# Patient Record
Sex: FEMALE | Race: OTHER | HISPANIC OR LATINO | Employment: UNEMPLOYED | URBAN - METROPOLITAN AREA
[De-identification: names, ages, dates, MRNs, and addresses within clinical notes are randomized per-mention and may not be internally consistent; named-entity substitution may affect disease eponyms.]

---

## 2022-12-22 ENCOUNTER — OFFICE VISIT (OUTPATIENT)
Dept: FAMILY MEDICINE CLINIC | Facility: CLINIC | Age: 42
End: 2022-12-22

## 2022-12-22 VITALS
RESPIRATION RATE: 16 BRPM | DIASTOLIC BLOOD PRESSURE: 60 MMHG | BODY MASS INDEX: 39.49 KG/M2 | HEART RATE: 61 BPM | WEIGHT: 237 LBS | OXYGEN SATURATION: 98 % | SYSTOLIC BLOOD PRESSURE: 120 MMHG | HEIGHT: 65 IN

## 2022-12-22 DIAGNOSIS — E55.9 VITAMIN D DEFICIENCY: ICD-10-CM

## 2022-12-22 DIAGNOSIS — Z11.4 SCREENING FOR HIV (HUMAN IMMUNODEFICIENCY VIRUS): ICD-10-CM

## 2022-12-22 DIAGNOSIS — M79.605 PAIN IN BOTH LOWER EXTREMITIES: ICD-10-CM

## 2022-12-22 DIAGNOSIS — Z12.31 ENCOUNTER FOR SCREENING MAMMOGRAM FOR BREAST CANCER: ICD-10-CM

## 2022-12-22 DIAGNOSIS — Z13.6 SCREENING FOR CARDIOVASCULAR CONDITION: ICD-10-CM

## 2022-12-22 DIAGNOSIS — M62.830 BACK SPASM: ICD-10-CM

## 2022-12-22 DIAGNOSIS — Z12.4 CERVICAL CANCER SCREENING: ICD-10-CM

## 2022-12-22 DIAGNOSIS — L30.9 DERMATITIS: Primary | ICD-10-CM

## 2022-12-22 DIAGNOSIS — J30.1 SEASONAL ALLERGIC RHINITIS DUE TO POLLEN: ICD-10-CM

## 2022-12-22 DIAGNOSIS — Z13.220 SCREENING, LIPID: ICD-10-CM

## 2022-12-22 DIAGNOSIS — E53.8 B12 DEFICIENCY: ICD-10-CM

## 2022-12-22 DIAGNOSIS — M79.604 PAIN IN BOTH LOWER EXTREMITIES: ICD-10-CM

## 2022-12-22 DIAGNOSIS — J45.20 MILD INTERMITTENT ASTHMA WITHOUT COMPLICATION: ICD-10-CM

## 2022-12-22 DIAGNOSIS — H10.9 CONJUNCTIVITIS OF BOTH EYES, UNSPECIFIED CONJUNCTIVITIS TYPE: ICD-10-CM

## 2022-12-22 DIAGNOSIS — Z11.59 NEED FOR HEPATITIS C SCREENING TEST: ICD-10-CM

## 2022-12-22 DIAGNOSIS — D50.9 IRON DEFICIENCY ANEMIA, UNSPECIFIED IRON DEFICIENCY ANEMIA TYPE: ICD-10-CM

## 2022-12-22 DIAGNOSIS — K21.9 GASTROESOPHAGEAL REFLUX DISEASE WITHOUT ESOPHAGITIS: ICD-10-CM

## 2022-12-22 DIAGNOSIS — E53.8 FOLIC ACID DEFICIENCY: ICD-10-CM

## 2022-12-22 RX ORDER — LANOLIN ALCOHOL/MO/W.PET/CERES
325 CREAM (GRAM) TOPICAL
COMMUNITY
End: 2022-12-22 | Stop reason: SDUPTHER

## 2022-12-22 RX ORDER — CYCLOBENZAPRINE HCL 10 MG
10 TABLET ORAL
Qty: 30 TABLET | Refills: 0
Start: 2022-12-22

## 2022-12-22 RX ORDER — AZELASTINE HYDROCHLORIDE 0.5 MG/ML
1 SOLUTION/ DROPS OPHTHALMIC 2 TIMES DAILY
Qty: 6 ML | Refills: 1 | Status: SHIPPED | OUTPATIENT
Start: 2022-12-22

## 2022-12-22 RX ORDER — LANOLIN ALCOHOL/MO/W.PET/CERES
325 CREAM (GRAM) TOPICAL
Qty: 1 TABLET | Refills: 0
Start: 2022-12-22

## 2022-12-22 RX ORDER — CLOTRIMAZOLE AND BETAMETHASONE DIPROPIONATE 10; .64 MG/G; MG/G
CREAM TOPICAL 2 TIMES DAILY
Qty: 30 G | Refills: 0 | Status: SHIPPED | OUTPATIENT
Start: 2022-12-22

## 2022-12-22 RX ORDER — MELATONIN
2000 DAILY
Qty: 180 TABLET | Refills: 3 | Status: SHIPPED | OUTPATIENT
Start: 2022-12-22

## 2022-12-22 RX ORDER — MULTIVITAMIN
1 CAPSULE ORAL DAILY
COMMUNITY

## 2022-12-22 RX ORDER — MELATONIN
1000 DAILY
COMMUNITY
End: 2022-12-22 | Stop reason: SDUPTHER

## 2022-12-22 RX ORDER — UBIDECARENONE 75 MG
CAPSULE ORAL DAILY
COMMUNITY

## 2022-12-22 RX ORDER — ALBUTEROL SULFATE 90 UG/1
2 AEROSOL, METERED RESPIRATORY (INHALATION) EVERY 6 HOURS PRN
Qty: 18 G | Refills: 2 | Status: SHIPPED | OUTPATIENT
Start: 2022-12-22

## 2022-12-22 RX ORDER — FOLIC ACID 1 MG/1
1 TABLET ORAL DAILY
Qty: 90 TABLET | Refills: 1 | Status: SHIPPED | OUTPATIENT
Start: 2022-12-22

## 2022-12-22 RX ORDER — OMEPRAZOLE 20 MG/1
20 CAPSULE, DELAYED RELEASE ORAL DAILY
Qty: 90 CAPSULE | Refills: 1 | Status: SHIPPED | OUTPATIENT
Start: 2022-12-22

## 2022-12-22 RX ORDER — SENNOSIDES 8.6 MG
650 CAPSULE ORAL EVERY 8 HOURS PRN
Qty: 30 TABLET | Refills: 3 | Status: SHIPPED | OUTPATIENT
Start: 2022-12-22

## 2022-12-22 RX ORDER — FLUTICASONE PROPIONATE 50 MCG
2 SPRAY, SUSPENSION (ML) NASAL DAILY
Qty: 16 G | Refills: 3 | Status: SHIPPED | OUTPATIENT
Start: 2022-12-22

## 2022-12-22 NOTE — PROGRESS NOTES
Name: Walt Alba      : 1980      MRN: [id number]  Encounter Provider: Marylu Pollard MD  Encounter Date: 2022   Encounter department: [place] [address]  Dermatitis  Assessment & Plan:  Dermatitis due to allergies, and use intermittently topical cream clotrimazole and betamethasone    Orders:  -     clotrimazole-betamethasone (LOTRISONE) 1-0 05 % cream; Apply topically 2 (two) times a day    2  Cervical cancer screening  -     Ambulatory Referral to Gynecology; Future    3  Encounter for screening mammogram for breast cancer  -     Mammo screening bilateral w 3d & cad; Future; Expected date: 2022    4  Mild intermittent asthma without complication  Assessment & Plan:  She use inhaler as needed    Orders:  -     albuterol (Ventolin HFA) 90 mcg/act inhaler; Inhale 2 puffs every 6 (six) hours as needed for wheezing  -     CBC and differential; Future; Expected date: 2022    5  Vitamin D deficiency  -     cholecalciferol (VITAMIN D3) 1,000 units tablet; Take 2 tablets (2,000 Units total) by mouth daily  -     Vitamin D 25 hydroxy; Future    6  Folic acid deficiency  Assessment & Plan:  Take supplements as she has history of gastric surgery    Orders:  -     folic acid (KP Folic Acid) 1 mg tablet; Take 1 tablet (1 mg total) by mouth daily    7  Seasonal allergic rhinitis due to pollen  Assessment & Plan:  She uses over-the-counter Allegra and Flonase as needed    Orders:  -     fluticasone (FLONASE) 50 mcg/act nasal spray; 2 sprays into each nostril daily    8  Conjunctivitis of both eyes, unspecified conjunctivitis type  Assessment & Plan:  Sometimes she get irritated and eyes become red and she uses topical drops has azelastine  Orders:  -     azelastine (OPTIVAR) 0 05 % ophthalmic solution; Administer 1 drop to both eyes 2 (two) times a day    9   Gastroesophageal reflux disease without esophagitis  -     omeprazole (PriLOSEC) 20 mg delayed release capsule; Take 1 capsule (20 mg total) by mouth daily    10  Back spasm  Assessment & Plan:  Use muscle relaxant sometimes    Orders:  -     cyclobenzaprine (FLEXERIL) 10 mg tablet; Take 1 tablet (10 mg total) by mouth daily at bedtime    11  Pain in both lower extremities  -     acetaminophen (TYLENOL) 650 mg CR tablet; Take 1 tablet (650 mg total) by mouth every 8 (eight) hours as needed for mild pain    12  Need for hepatitis C screening test  -     Hepatitis C antibody; Future; Expected date: 12/22/2022    13  Iron deficiency anemia, unspecified iron deficiency anemia type  -     CBC and differential; Future; Expected date: 12/22/2022  -     Ferritin; Future  -     ferrous sulfate 325 (65 FE) MG EC tablet; Take 1 tablet (325 mg total) by mouth daily with breakfast    14  Screening, lipid  -     Lipid panel; Future; Expected date: 12/22/2022    15  Screening for cardiovascular condition  -     TSH, 3rd generation; Future; Expected date: 12/22/2022  -     Comprehensive metabolic panel; Future    16  B12 deficiency  Assessment & Plan:  Take supplements as she has history of gastric surgery    Orders:  -     Vitamin B12; Future; Expected date: 12/22/2022    17  Screening for HIV (human immunodeficiency virus)  -     HIV 1/2 AG/AB w Reflex [facility] for 2 yr old and above; Future      BMI Counseling: Body mass index is 39 44 kg/m²  The BMI is above normal  Nutrition recommendations include decreasing portion sizes, moderation in carbohydrate intake and reducing intake of cholesterol  Exercise recommendations include exercising 3-5 times per week  Rationale for BMI follow-up plan is due to patient being overweight or obese  Depression Screening and Follow-up Plan: Patient was screened for depression during today's encounter  They screened negative with a PHQ-2 score of 0  Subjective     She is a new patient, she is Romansh-speaking, she moved from the [place]    She is on multiple medication and needs some refills and she is here to establish care  She has seasonal allergies and uses her allergy medicine as needed, she also says with certain foods or sometimes due to seasonal she gets skin itch and rashes and she was given the clotrimazole betamethasone cream and she needs a refill on that and she use that as needed, she also get muscle cramps specially when weather is humid and she takes Tylenol more frequently and needs a prescription  Occasionally she is given the muscle relaxant for her back pain  He has history of gastric bypass surgery and she is on supplements multivitamins, folic acid, iron, C38, vitamin D 2000 units daily and she had a blood work few months ago  She also has GERD    Review of Systems   Constitutional: Negative for activity change, appetite change, chills, fatigue, fever and unexpected weight change  HENT: Negative for congestion, ear discharge, ear pain, nosebleeds, postnasal drip, rhinorrhea, sinus pressure, sneezing, sore throat, trouble swallowing and voice change  Eyes: Negative for photophobia, pain, discharge, redness and itching  Respiratory: Negative for cough, chest tightness, shortness of breath and wheezing  Cardiovascular: Negative for chest pain, palpitations and leg swelling  Gastrointestinal: Negative for abdominal pain, constipation, diarrhea, nausea and vomiting  Endocrine: Negative for polyuria  Genitourinary: Negative for dysuria, frequency and urgency  Musculoskeletal: Negative for arthralgias, back pain, myalgias and neck pain  Skin: Negative for color change, pallor and rash  Allergic/Immunologic: Negative for environmental allergies and food allergies  Neurological: Negative for dizziness, weakness, light-headedness and headaches  Hematological: Negative for adenopathy  Does not bruise/bleed easily  Psychiatric/Behavioral: Negative for behavioral problems  The patient is not nervous/anxious  History reviewed   No pertinent past medical history  Past Surgical History:   Procedure Laterality Date   • ANKLE ARTHROSCOPY     • GASTRIC BYPASS LAPAROSCOPIC       Family History   Problem Relation Age of Onset   • Diabetes Mother    • Thyroid disease Mother    • No Known Problems Father    • Breast cancer Maternal Grandmother    • Colon cancer Maternal Grandmother      Social History     Socioeconomic History   • Marital status: /Civil Union     Spouse name: None   • Number of children: None   • Years of education: None   • Highest education level: None   Occupational History   • None   Tobacco Use   • Smoking status: Never   • Smokeless tobacco: Never   Substance and Sexual Activity   • Alcohol use: None   • Drug use: None   • Sexual activity: None   Other Topics Concern   • None   Social History Narrative   • None     Social Determinants of Health     Financial Resource Strain: Not on file   Food Insecurity: Not on file   Transportation Needs: Not on file   Physical Activity: Not on file   Stress: Not on file   Social Connections: Not on file   Intimate Partner Violence: Not on file   Housing Stability: Not on file     Current Outpatient Medications on File Prior to Visit   Medication Sig   • cyanocobalamin (VITAMIN B-12) 100 mcg tablet Take by mouth daily   • Multiple Vitamin (multivitamin) capsule Take 1 capsule by mouth daily   • [DISCONTINUED] cholecalciferol (VITAMIN D3) 1,000 units tablet Take 1,000 Units by mouth daily   • [DISCONTINUED] ferrous sulfate 325 (65 FE) MG EC tablet Take 325 mg by mouth 3 (three) times a day with meals     Allergies   Allergen Reactions   • Shellfish Allergy - Food Allergy Dermatitis, Itching, Palpitations, Rash, Shortness Of Breath, Swelling, Throat Swelling and Vomiting   • Contrast [Iodinated Diagnostic Agents] Hives       There is no immunization history on file for this patient      Objective     /60   Pulse 61   Resp 16   Ht 5' 5" (1 651 m)   Wt 108 kg (237 lb)   SpO2 98%   BMI 39 44 kg/m²     Physical Exam  Vitals and nursing note reviewed  Constitutional:       Appearance: She is well-developed  HENT:      Head: Normocephalic and atraumatic  Right Ear: External ear normal       Left Ear: External ear normal       Nose: Nose normal       Mouth/Throat:      Pharynx: No oropharyngeal exudate  Eyes:      General: No scleral icterus  Right eye: No discharge  Left eye: No discharge  Conjunctiva/sclera: Conjunctivae normal       Pupils: Pupils are equal, round, and reactive to light  Neck:      Thyroid: No thyromegaly  Trachea: No tracheal deviation  Cardiovascular:      Rate and Rhythm: Normal rate and regular rhythm  Heart sounds: Normal heart sounds  No murmur heard  Pulmonary:      Effort: Pulmonary effort is normal  No respiratory distress  Breath sounds: Normal breath sounds  No wheezing or rales  Abdominal:      General: Bowel sounds are normal  There is no distension  Palpations: Abdomen is soft  There is no mass  Tenderness: There is no abdominal tenderness  There is no rebound  Musculoskeletal:         General: Normal range of motion  Cervical back: Normal range of motion and neck supple  Right lower leg: No edema  Left lower leg: No edema  Lymphadenopathy:      Cervical: No cervical adenopathy  Skin:     General: Skin is warm  Coloration: Skin is not pale  Findings: No erythema or rash  Neurological:      General: No focal deficit present  Mental Status: She is alert and oriented to person, place, and time  Cranial Nerves: No cranial nerve deficit  Deep Tendon Reflexes: Reflexes are normal and symmetric  Psychiatric:         Behavior: Behavior normal          Thought Content:  Thought content normal          Judgment: Judgment normal        Emerson Seth MD

## 2023-01-03 ENCOUNTER — IMMUNIZATIONS (OUTPATIENT)
Dept: FAMILY MEDICINE CLINIC | Facility: CLINIC | Age: 43
End: 2023-01-03

## 2023-01-03 DIAGNOSIS — Z23 ENCOUNTER FOR IMMUNIZATION: Primary | ICD-10-CM

## 2023-01-09 ENCOUNTER — TELEPHONE (OUTPATIENT)
Dept: FAMILY MEDICINE CLINIC | Facility: CLINIC | Age: 43
End: 2023-01-09

## 2023-01-09 DIAGNOSIS — Z11.1 SCREENING-PULMONARY TB: Primary | ICD-10-CM

## 2023-01-10 ENCOUNTER — LAB (OUTPATIENT)
Dept: LAB | Facility: CLINIC | Age: 43
End: 2023-01-10

## 2023-01-10 DIAGNOSIS — Z11.1 SCREENING-PULMONARY TB: ICD-10-CM

## 2023-01-12 LAB
GAMMA INTERFERON BACKGROUND BLD IA-ACNC: 0.04 IU/ML
M TB IFN-G BLD-IMP: NEGATIVE
M TB IFN-G CD4+ BCKGRND COR BLD-ACNC: 0 IU/ML
M TB IFN-G CD4+ BCKGRND COR BLD-ACNC: 0 IU/ML
MITOGEN IGNF BCKGRD COR BLD-ACNC: >10 IU/ML

## 2023-01-13 ENCOUNTER — TELEPHONE (OUTPATIENT)
Dept: FAMILY MEDICINE CLINIC | Facility: CLINIC | Age: 43
End: 2023-01-13

## 2023-01-13 NOTE — TELEPHONE ENCOUNTER
----- Message from Rufina Valentin MD sent at 1/12/2023 11:42 AM EST -----  Normal labs, please inform the patient

## 2023-02-09 ENCOUNTER — HOSPITAL ENCOUNTER (OUTPATIENT)
Dept: RADIOLOGY | Facility: HOSPITAL | Age: 43
Discharge: HOME | End: 2023-02-09
Attending: FAMILY MEDICINE

## 2023-02-09 VITALS — WEIGHT: 240 LBS | HEIGHT: 65 IN | BODY MASS INDEX: 39.99 KG/M2

## 2023-02-09 DIAGNOSIS — Z12.31 ENCOUNTER FOR SCREENING MAMMOGRAM FOR BREAST CANCER: ICD-10-CM

## 2023-02-15 ENCOUNTER — TELEPHONE (OUTPATIENT)
Dept: FAMILY MEDICINE CLINIC | Facility: CLINIC | Age: 43
End: 2023-02-15

## 2023-02-15 NOTE — TELEPHONE ENCOUNTER
----- Message from Graciella Fleischer, MD sent at 2/15/2023 11:19 AM EST -----  Normal mammogram, please inform the patient

## 2023-02-20 PROBLEM — Z12.4 CERVICAL CANCER SCREENING: Status: RESOLVED | Noted: 2022-12-22 | Resolved: 2023-02-20

## 2023-02-20 PROBLEM — H10.9 CONJUNCTIVITIS OF BOTH EYES: Status: RESOLVED | Noted: 2022-12-22 | Resolved: 2023-02-20

## 2023-02-20 PROBLEM — Z11.59 NEED FOR HEPATITIS C SCREENING TEST: Status: RESOLVED | Noted: 2022-12-22 | Resolved: 2023-02-20

## 2023-03-15 ENCOUNTER — APPOINTMENT (OUTPATIENT)
Dept: LAB | Facility: CLINIC | Age: 43
End: 2023-03-15

## 2023-03-15 DIAGNOSIS — J45.20 MILD INTERMITTENT ASTHMA WITHOUT COMPLICATION: ICD-10-CM

## 2023-03-15 DIAGNOSIS — Z11.59 NEED FOR HEPATITIS C SCREENING TEST: ICD-10-CM

## 2023-03-15 DIAGNOSIS — Z11.4 SCREENING FOR HIV (HUMAN IMMUNODEFICIENCY VIRUS): ICD-10-CM

## 2023-03-15 DIAGNOSIS — E53.8 B12 DEFICIENCY: ICD-10-CM

## 2023-03-15 DIAGNOSIS — E55.9 VITAMIN D DEFICIENCY: ICD-10-CM

## 2023-03-15 DIAGNOSIS — Z13.6 SCREENING FOR CARDIOVASCULAR CONDITION: ICD-10-CM

## 2023-03-15 DIAGNOSIS — D50.9 IRON DEFICIENCY ANEMIA, UNSPECIFIED IRON DEFICIENCY ANEMIA TYPE: ICD-10-CM

## 2023-03-15 DIAGNOSIS — Z13.220 SCREENING, LIPID: ICD-10-CM

## 2023-03-15 LAB
25(OH)D3 SERPL-MCNC: 24.7 NG/ML (ref 30–100)
ALBUMIN SERPL BCP-MCNC: 3.6 G/DL (ref 3.5–5)
ALP SERPL-CCNC: 59 U/L (ref 46–116)
ALT SERPL W P-5'-P-CCNC: 22 U/L (ref 12–78)
ANION GAP SERPL CALCULATED.3IONS-SCNC: 5 MMOL/L (ref 4–13)
AST SERPL W P-5'-P-CCNC: 23 U/L (ref 5–45)
BASOPHILS # BLD AUTO: 0.05 THOUSANDS/ÂΜL (ref 0–0.1)
BASOPHILS NFR BLD AUTO: 1 % (ref 0–1)
BILIRUB SERPL-MCNC: 0.51 MG/DL (ref 0.2–1)
BUN SERPL-MCNC: 12 MG/DL (ref 5–25)
CALCIUM SERPL-MCNC: 9.4 MG/DL (ref 8.3–10.1)
CHLORIDE SERPL-SCNC: 101 MMOL/L (ref 96–108)
CHOLEST SERPL-MCNC: 173 MG/DL
CO2 SERPL-SCNC: 28 MMOL/L (ref 21–32)
CREAT SERPL-MCNC: 0.67 MG/DL (ref 0.6–1.3)
EOSINOPHIL # BLD AUTO: 0.14 THOUSAND/ÂΜL (ref 0–0.61)
EOSINOPHIL NFR BLD AUTO: 2 % (ref 0–6)
ERYTHROCYTE [DISTWIDTH] IN BLOOD BY AUTOMATED COUNT: 12.6 % (ref 11.6–15.1)
FERRITIN SERPL-MCNC: 15 NG/ML (ref 8–388)
GFR SERPL CREATININE-BSD FRML MDRD: 108 ML/MIN/1.73SQ M
GLUCOSE P FAST SERPL-MCNC: 93 MG/DL (ref 65–99)
HCT VFR BLD AUTO: 39.6 % (ref 34.8–46.1)
HDLC SERPL-MCNC: 100 MG/DL
HGB BLD-MCNC: 12.1 G/DL (ref 11.5–15.4)
IMM GRANULOCYTES # BLD AUTO: 0.01 THOUSAND/UL (ref 0–0.2)
IMM GRANULOCYTES NFR BLD AUTO: 0 % (ref 0–2)
LDLC SERPL CALC-MCNC: 56 MG/DL (ref 0–100)
LYMPHOCYTES # BLD AUTO: 1.57 THOUSANDS/ÂΜL (ref 0.6–4.47)
LYMPHOCYTES NFR BLD AUTO: 25 % (ref 14–44)
MCH RBC QN AUTO: 29.1 PG (ref 26.8–34.3)
MCHC RBC AUTO-ENTMCNC: 30.6 G/DL (ref 31.4–37.4)
MCV RBC AUTO: 95 FL (ref 82–98)
MONOCYTES # BLD AUTO: 0.48 THOUSAND/ÂΜL (ref 0.17–1.22)
MONOCYTES NFR BLD AUTO: 8 % (ref 4–12)
NEUTROPHILS # BLD AUTO: 4.1 THOUSANDS/ÂΜL (ref 1.85–7.62)
NEUTS SEG NFR BLD AUTO: 64 % (ref 43–75)
NONHDLC SERPL-MCNC: 73 MG/DL
NRBC BLD AUTO-RTO: 0 /100 WBCS
PLATELET # BLD AUTO: 273 THOUSANDS/UL (ref 149–390)
PMV BLD AUTO: 11.1 FL (ref 8.9–12.7)
POTASSIUM SERPL-SCNC: 4 MMOL/L (ref 3.5–5.3)
PROT SERPL-MCNC: 7.1 G/DL (ref 6.4–8.4)
RBC # BLD AUTO: 4.16 MILLION/UL (ref 3.81–5.12)
SODIUM SERPL-SCNC: 134 MMOL/L (ref 135–147)
TRIGL SERPL-MCNC: 83 MG/DL
TSH SERPL DL<=0.05 MIU/L-ACNC: 1.75 UIU/ML (ref 0.45–4.5)
VIT B12 SERPL-MCNC: 613 PG/ML (ref 100–900)
WBC # BLD AUTO: 6.35 THOUSAND/UL (ref 4.31–10.16)

## 2023-03-16 LAB
HCV AB SER QL: NORMAL
HIV 1+2 AB+HIV1 P24 AG SERPL QL IA: NORMAL
HIV 2 AB SERPL QL IA: NORMAL
HIV1 AB SERPL QL IA: NORMAL
HIV1 P24 AG SERPL QL IA: NORMAL

## 2023-03-29 ENCOUNTER — OFFICE VISIT (OUTPATIENT)
Dept: FAMILY MEDICINE CLINIC | Facility: CLINIC | Age: 43
End: 2023-03-29

## 2023-03-29 VITALS
RESPIRATION RATE: 16 BRPM | HEIGHT: 65 IN | OXYGEN SATURATION: 98 % | WEIGHT: 244 LBS | HEART RATE: 70 BPM | SYSTOLIC BLOOD PRESSURE: 110 MMHG | DIASTOLIC BLOOD PRESSURE: 68 MMHG | BODY MASS INDEX: 40.65 KG/M2

## 2023-03-29 DIAGNOSIS — L30.9 DERMATITIS: ICD-10-CM

## 2023-03-29 DIAGNOSIS — R10.2 PELVIC PAIN IN FEMALE: ICD-10-CM

## 2023-03-29 DIAGNOSIS — E53.8 B12 DEFICIENCY: ICD-10-CM

## 2023-03-29 DIAGNOSIS — E55.9 VITAMIN D DEFICIENCY: Primary | ICD-10-CM

## 2023-03-29 RX ORDER — TRIAMCINOLONE ACETONIDE 1 MG/G
CREAM TOPICAL 2 TIMES DAILY
Qty: 30 G | Refills: 1 | Status: SHIPPED | OUTPATIENT
Start: 2023-03-29

## 2023-03-29 NOTE — PROGRESS NOTES
Name: Chen Vaughn      : 1980      MRN: [id number]  Encounter Provider: Ashleigh Brownlee MD  Encounter Date: 3/29/2023   Encounter department: [facility]     1  Vitamin D deficiency  Assessment & Plan:  5000 units po daily otc     Orders:  -     Cholecalciferol (Vitamin D-3) 125 MCG (5000 UT) TABS; Take 1 tablet by mouth in the morning  -     Vitamin D 25 hydroxy; Future; Expected date: 2023    2  Dermatitis  Assessment & Plan:  Dry rough skin on the palm of the right hand will try triamcinolone and follow-up if not better    Orders:  -     triamcinolone (KENALOG) 0 1 % cream; Apply topically 2 (two) times a day    3  Pelvic pain in female  Assessment & Plan:  Has appointment with gynecologist in , in the meantime we will get the ultrasound of the pelvis, she complains of right pelvic pain intermittently for 4 months and it starts with the menstruation and continuous for about 7 days     Orders:  -     US pelvis complete non OB; Future; Expected date: 2023  -     CBC and differential; Future; Expected date: 2023  -     Comprehensive metabolic panel; Future; Expected date: 2023    4  B12 deficiency  Assessment & Plan:  Continue B12 supplement, her B12 level is normal    Orders:  -     Vitamin B12; Future; Expected date: 2023           Subjective     She is here for follow-up, she has history of gastric surgery and she takes supplements vitamin D, Q50 and folic acid, she has gained weight 7 pounds since last visit, she does not do any regular exercise, she also complains of right side pelvic pain for last 4 months, she says it comes with her periods  and last for 7 days  She has appointment with gynecologist in , she has normal menstruation  She also complained of dry itchy skin on the right and on the palm which did not improve with the Lotrisone    Review of Systems   Constitutional: Negative  HENT: Negative      Eyes: Negative  Respiratory: Negative  Gastrointestinal: Negative  No past medical history on file    Past Surgical History:   Procedure Laterality Date   • ANKLE ARTHROSCOPY     • GASTRIC BYPASS LAPAROSCOPIC       Family History   Problem Relation Age of Onset   • Diabetes Mother    • Thyroid disease Mother    • No Known Problems Sister    • No Known Problems Daughter    • Breast cancer Maternal Grandmother    • Colon cancer Maternal Grandmother    • No Known Problems Maternal Grandfather    • No Known Problems Paternal Grandmother    • No Known Problems Paternal Grandfather    • No Known Problems Maternal Aunt    • No Known Problems Maternal Aunt    • No Known Problems Maternal Aunt    • No Known Problems Maternal Aunt    • No Known Problems Maternal Aunt    • No Known Problems Maternal Aunt    • No Known Problems Maternal Aunt      Social History     Socioeconomic History   • Marital status: /Civil Union     Spouse name: None   • Number of children: None   • Years of education: None   • Highest education level: None   Occupational History   • None   Tobacco Use   • Smoking status: Never   • Smokeless tobacco: Never   Substance and Sexual Activity   • Alcohol use: None   • Drug use: None   • Sexual activity: None   Other Topics Concern   • None   Social History Narrative   • None     Social Determinants of Health     Financial Resource Strain: Not on file   Food Insecurity: Not on file   Transportation Needs: Not on file   Physical Activity: Not on file   Stress: Not on file   Social Connections: Not on file   Intimate Partner Violence: Not on file   Housing Stability: Not on file     Current Outpatient Medications on File Prior to Visit   Medication Sig   • acetaminophen (TYLENOL) 650 mg CR tablet Take 1 tablet (650 mg total) by mouth every 8 (eight) hours as needed for mild pain   • albuterol (Ventolin HFA) 90 mcg/act inhaler Inhale 2 puffs every 6 (six) hours as needed for wheezing   • azelastine "(OPTIVAR) 0 05 % ophthalmic solution Administer 1 drop to both eyes 2 (two) times a day   • cyanocobalamin (VITAMIN B-12) 100 mcg tablet Take by mouth daily   • cyclobenzaprine (FLEXERIL) 10 mg tablet Take 1 tablet (10 mg total) by mouth daily at bedtime   • ferrous sulfate 325 (65 FE) MG EC tablet Take 1 tablet (325 mg total) by mouth daily with breakfast   • fluticasone (FLONASE) 50 mcg/act nasal spray 2 sprays into each nostril daily   • folic acid (KP Folic Acid) 1 mg tablet Take 1 tablet (1 mg total) by mouth daily   • Multiple Vitamin (multivitamin) capsule Take 1 capsule by mouth daily   • omeprazole (PriLOSEC) 20 mg delayed release capsule Take 1 capsule (20 mg total) by mouth daily   • [DISCONTINUED] cholecalciferol (VITAMIN D3) 1,000 units tablet Take 2 tablets (2,000 Units total) by mouth daily   • [DISCONTINUED] clotrimazole-betamethasone (LOTRISONE) 1-0 05 % cream Apply topically 2 (two) times a day     Allergies   Allergen Reactions   • Shellfish Allergy - Food Allergy Dermatitis, Itching, Palpitations, Rash, Shortness Of Breath, Swelling, Throat Swelling and Vomiting   • Contrast [Iodinated Contrast Media] Hives     Immunization History   Administered Date(s) Administered   • COVID-19 PFIZER VACCINE 0 3 ML IM 05/05/2021, 05/26/2021   • INFLUENZA 01/03/2023   • Influenza, recombinant, quadrivalent,injectable, preservative free 01/03/2023       Objective     /68   Pulse 70   Resp 16   Ht 5' 5\" (1 651 m)   Wt 111 kg (244 lb)   SpO2 98%   BMI 40 60 kg/m²     Physical Exam  Vitals and nursing note reviewed  Constitutional:       Appearance: She is well-developed  She is not ill-appearing  HENT:      Head: Normocephalic and atraumatic  Right Ear: External ear normal       Left Ear: External ear normal    Eyes:      General: No scleral icterus  Extraocular Movements: Extraocular movements intact  Conjunctiva/sclera: Conjunctivae normal    Neck:      Thyroid: No thyromegaly   " Cardiovascular:      Rate and Rhythm: Normal rate and regular rhythm  Heart sounds: Normal heart sounds  No murmur heard  Pulmonary:      Effort: Pulmonary effort is normal       Breath sounds: Normal breath sounds  No wheezing or rales  Abdominal:      Comments: Mild tenderness in the right lower abdomen   Musculoskeletal:      Cervical back: Normal range of motion and neck supple  Lymphadenopathy:      Cervical: No cervical adenopathy  Skin:     Findings: No erythema or rash  Comments: Dry rough skin on rt palm    Neurological:      Mental Status: She is alert         Nancy Solis MD

## 2023-03-29 NOTE — ASSESSMENT & PLAN NOTE
Has appointment with gynecologist in June, in the meantime we will get the ultrasound of the pelvis, she complains of right pelvic pain intermittently for 4 months and it starts with the menstruation and continuous for about 7 days

## 2023-04-05 DIAGNOSIS — J30.1 SEASONAL ALLERGIC RHINITIS DUE TO POLLEN: ICD-10-CM

## 2023-04-05 RX ORDER — FLUTICASONE PROPIONATE 50 MCG
SPRAY, SUSPENSION (ML) NASAL
Qty: 48 ML | Refills: 2 | Status: SHIPPED | OUTPATIENT
Start: 2023-04-05

## 2023-05-03 ENCOUNTER — OFFICE VISIT (OUTPATIENT)
Dept: FAMILY MEDICINE CLINIC | Facility: CLINIC | Age: 43
End: 2023-05-03

## 2023-05-03 VITALS
BODY MASS INDEX: 40.32 KG/M2 | HEIGHT: 65 IN | SYSTOLIC BLOOD PRESSURE: 122 MMHG | RESPIRATION RATE: 16 BRPM | OXYGEN SATURATION: 98 % | HEART RATE: 64 BPM | WEIGHT: 242 LBS | DIASTOLIC BLOOD PRESSURE: 70 MMHG

## 2023-05-03 DIAGNOSIS — N92.1 MENORRHAGIA WITH IRREGULAR CYCLE: ICD-10-CM

## 2023-05-03 DIAGNOSIS — R10.2 PELVIC PAIN IN FEMALE: Primary | ICD-10-CM

## 2023-05-03 DIAGNOSIS — D25.2 INTRAMURAL AND SUBSEROUS LEIOMYOMA OF UTERUS: ICD-10-CM

## 2023-05-03 DIAGNOSIS — D25.1 INTRAMURAL AND SUBSEROUS LEIOMYOMA OF UTERUS: ICD-10-CM

## 2023-05-03 PROBLEM — S02.2XXA NASAL FRACTURE: Status: ACTIVE | Noted: 2023-05-03

## 2023-05-03 PROBLEM — Z98.84 HISTORY OF ROUX-EN-Y GASTRIC BYPASS: Status: ACTIVE | Noted: 2020-06-28

## 2023-05-03 NOTE — ASSESSMENT & PLAN NOTE
Pelvic pain is due to the fibroid uterus and this happens during her menstruation, she can take ibuprofen as needed but she will try to avoid it because she has history of gastric surgery

## 2023-05-03 NOTE — ASSESSMENT & PLAN NOTE
She has heavy irregular menstruation and she feels a lot of pelvic pain and cramping, ultrasound shows 2 fibroid, largest is 4 8 into 6 into 5 cm  The other fibroid is 3 3 into 2 6 and two 2 7 cm  She has appointment with gynecologist next month, discussed  options of treatment

## 2023-05-03 NOTE — PROGRESS NOTES
Name: Gómez Woods      : 1980      MRN: [id number]  Encounter Provider: Kimberly Navarrete MD  Encounter Date: 5/3/2023   Encounter department: [facility]     1  Pelvic pain in female  Assessment & Plan:  Pelvic pain is due to the fibroid uterus and this happens during her menstruation, she can take ibuprofen as needed but she will try to avoid it because she has history of gastric surgery      2  Intramural and subserous leiomyoma of uterus  Assessment & Plan:  She has heavy irregular menstruation and she feels a lot of pelvic pain and cramping, ultrasound shows 2 fibroid, largest is 4 8 into 6 into 5 cm  The other fibroid is 3 3 into 2 6 and two 2 7 cm  She has appointment with gynecologist next month, discussed  options of treatment      3  Menorrhagia with irregular cycle  Assessment & Plan:  Previously her periods  were normal, she says this time she had cycle earlier than normal time and it was very heavy and she passed clots, she has appointment with gynecologist next month and she will discuss the treatment option as she has fibroid uterus             Subjective     HPI  Review of Systems   Constitutional: Negative  Eyes: Negative  Respiratory: Negative  Cardiovascular: Negative  Gastrointestinal: Negative  Genitourinary: Positive for menstrual problem and pelvic pain  No past medical history on file    Past Surgical History:   Procedure Laterality Date    ANKLE ARTHROSCOPY      GASTRIC BYPASS LAPAROSCOPIC       Family History   Problem Relation Age of Onset    Diabetes Mother     Thyroid disease Mother     No Known Problems Sister     No Known Problems Daughter     Breast cancer Maternal Grandmother     Colon cancer Maternal Grandmother     No Known Problems Maternal Grandfather     No Known Problems Paternal Grandmother     No Known Problems Paternal Grandfather     No Known Problems Maternal Aunt     No Known Problems Maternal Aunt     No Known Problems Maternal Aunt     No Known Problems Maternal Aunt     No Known Problems Maternal Aunt     No Known Problems Maternal Aunt     No Known Problems Maternal Aunt      Social History     Socioeconomic History    Marital status: /Civil Union     Spouse name: None    Number of children: None    Years of education: None    Highest education level: None   Occupational History    None   Tobacco Use    Smoking status: Never    Smokeless tobacco: Never   Substance and Sexual Activity    Alcohol use: None    Drug use: None    Sexual activity: None   Other Topics Concern    None   Social History Narrative    None     Social Determinants of Health     Financial Resource Strain: Not on file   Food Insecurity: Not on file   Transportation Needs: Not on file   Physical Activity: Not on file   Stress: Not on file   Social Connections: Not on file   Intimate Partner Violence: Not on file   Housing Stability: Not on file     Current Outpatient Medications on File Prior to Visit   Medication Sig    acetaminophen (TYLENOL) 650 mg CR tablet Take 1 tablet (650 mg total) by mouth every 8 (eight) hours as needed for mild pain    albuterol (Ventolin HFA) 90 mcg/act inhaler Inhale 2 puffs every 6 (six) hours as needed for wheezing    azelastine (OPTIVAR) 0 05 % ophthalmic solution Administer 1 drop to both eyes 2 (two) times a day    Cholecalciferol (Vitamin D-3) 125 MCG (5000 UT) TABS Take 1 tablet by mouth in the morning    cyanocobalamin (VITAMIN B-12) 100 mcg tablet Take by mouth daily    cyclobenzaprine (FLEXERIL) 10 mg tablet Take 1 tablet (10 mg total) by mouth daily at bedtime    ferrous sulfate 325 (65 FE) MG EC tablet Take 1 tablet (325 mg total) by mouth daily with breakfast    fluticasone (FLONASE) 50 mcg/act nasal spray SPRAY 2 SPRAYS INTO EACH NOSTRIL EVERY DAY    folic acid (KP Folic Acid) 1 mg tablet Take 1 tablet (1 mg total) by mouth daily    Multiple "Vitamin (multivitamin) capsule Take 1 capsule by mouth daily    omeprazole (PriLOSEC) 20 mg delayed release capsule Take 1 capsule (20 mg total) by mouth daily    triamcinolone (KENALOG) 0 1 % cream Apply topically 2 (two) times a day     Allergies   Allergen Reactions    Shellfish Allergy - Food Allergy Dermatitis, Itching, Palpitations, Rash, Shortness Of Breath, Swelling, Throat Swelling and Vomiting    Contrast [Iodinated Contrast Media] Hives     Immunization History   Administered Date(s) Administered    COVID-19 PFIZER VACCINE 0 3 ML IM 05/05/2021, 05/26/2021    INFLUENZA 10/23/2013, 10/10/2017, 09/13/2018, 11/13/2019, 10/27/2020, 10/20/2021, 01/03/2023    Influenza, recombinant, quadrivalent,injectable, preservative free 01/03/2023    Tdap 09/13/2018       Objective     /70   Pulse 64   Resp 16   Ht 5' 5\" (1 651 m)   Wt 110 kg (242 lb)   SpO2 98%   BMI 40 27 kg/m²     Physical Exam  Vitals and nursing note reviewed  Constitutional:       Appearance: Normal appearance  She is not ill-appearing  Cardiovascular:      Rate and Rhythm: Normal rate  Pulmonary:      Effort: Pulmonary effort is normal    Abdominal:      General: Abdomen is flat  There is no distension  Palpations: There is no mass  Tenderness: There is no abdominal tenderness  Musculoskeletal:      Cervical back: Normal range of motion     Psychiatric:         Mood and Affect: Mood normal        Abisai Booker MD  "

## 2023-05-03 NOTE — ASSESSMENT & PLAN NOTE
Previously her periods  were normal, she says this time she had cycle earlier than normal time and it was very heavy and she passed clots, she has appointment with gynecologist next month and she will discuss the treatment option as she has fibroid uterus

## 2023-05-16 DIAGNOSIS — J30.1 SEASONAL ALLERGIC RHINITIS DUE TO POLLEN: ICD-10-CM

## 2023-05-16 DIAGNOSIS — M79.605 PAIN IN BOTH LOWER EXTREMITIES: ICD-10-CM

## 2023-05-16 DIAGNOSIS — E55.9 VITAMIN D DEFICIENCY: ICD-10-CM

## 2023-05-16 DIAGNOSIS — J45.20 MILD INTERMITTENT ASTHMA WITHOUT COMPLICATION: ICD-10-CM

## 2023-05-16 DIAGNOSIS — L30.9 DERMATITIS: ICD-10-CM

## 2023-05-16 DIAGNOSIS — M79.604 PAIN IN BOTH LOWER EXTREMITIES: ICD-10-CM

## 2023-05-16 DIAGNOSIS — H10.9 CONJUNCTIVITIS OF BOTH EYES, UNSPECIFIED CONJUNCTIVITIS TYPE: ICD-10-CM

## 2023-05-16 DIAGNOSIS — D50.9 IRON DEFICIENCY ANEMIA, UNSPECIFIED IRON DEFICIENCY ANEMIA TYPE: ICD-10-CM

## 2023-05-16 RX ORDER — ALBUTEROL SULFATE 90 UG/1
2 AEROSOL, METERED RESPIRATORY (INHALATION) EVERY 6 HOURS PRN
Qty: 18 G | Refills: 0 | Status: SHIPPED | OUTPATIENT
Start: 2023-05-16

## 2023-05-16 RX ORDER — LANOLIN ALCOHOL/MO/W.PET/CERES
325 CREAM (GRAM) TOPICAL
Qty: 1 TABLET | Refills: 0 | Status: SHIPPED | OUTPATIENT
Start: 2023-05-16

## 2023-05-16 RX ORDER — AZELASTINE HYDROCHLORIDE 0.5 MG/ML
1 SOLUTION/ DROPS OPHTHALMIC 2 TIMES DAILY
Qty: 6 ML | Refills: 0 | Status: SHIPPED | OUTPATIENT
Start: 2023-05-16

## 2023-05-16 RX ORDER — SENNOSIDES 8.6 MG
650 CAPSULE ORAL EVERY 8 HOURS PRN
Qty: 30 TABLET | Refills: 0 | Status: SHIPPED | OUTPATIENT
Start: 2023-05-16

## 2023-05-16 RX ORDER — TRIAMCINOLONE ACETONIDE 1 MG/G
CREAM TOPICAL 2 TIMES DAILY
Qty: 30 G | Refills: 0 | Status: SHIPPED | OUTPATIENT
Start: 2023-05-16

## 2023-05-16 RX ORDER — FLUTICASONE PROPIONATE 50 MCG
2 SPRAY, SUSPENSION (ML) NASAL DAILY
Qty: 48 ML | Refills: 1 | Status: SHIPPED | OUTPATIENT
Start: 2023-05-16

## 2023-06-07 ENCOUNTER — OFFICE VISIT (OUTPATIENT)
Dept: OBGYN CLINIC | Facility: CLINIC | Age: 43
End: 2023-06-07
Payer: COMMERCIAL

## 2023-06-07 VITALS — DIASTOLIC BLOOD PRESSURE: 80 MMHG | WEIGHT: 245 LBS | BODY MASS INDEX: 40.77 KG/M2 | SYSTOLIC BLOOD PRESSURE: 124 MMHG

## 2023-06-07 DIAGNOSIS — Z12.4 CERVICAL CANCER SCREENING: Primary | ICD-10-CM

## 2023-06-07 DIAGNOSIS — N92.1 MENORRHAGIA WITH IRREGULAR CYCLE: ICD-10-CM

## 2023-06-07 PROCEDURE — 99386 PREV VISIT NEW AGE 40-64: CPT | Performed by: PHYSICIAN ASSISTANT

## 2023-06-07 RX ORDER — ACETAMINOPHEN AND CODEINE PHOSPHATE 120; 12 MG/5ML; MG/5ML
1 SOLUTION ORAL DAILY
Qty: 28 TABLET | Refills: 3 | Status: SHIPPED | OUTPATIENT
Start: 2023-06-07

## 2023-06-07 NOTE — PROGRESS NOTES
Assessment/Plan   Problem List Items Addressed This Visit        Other    Menorrhagia with irregular cycle    Relevant Medications    norethindrone (Ortho Micronor) 0 35 MG tablet   Other Visit Diagnoses     Cervical cancer screening    -  Primary    Relevant Orders    Thinprep Tis Pap and HPV mRNA E6/E7 Reflex HPV 16,18/45          Discussion    All questions have been answered to her satisfaction  RTO for APE or sooner if needed  Pap done  Mammo up to date  Will plan to start POP with next menses  Will plan 3 mth pill check to review options if sx are not improved  Pt hesitant to utilize IUD due to bad experience w family member  Subjective     HPI   Saran Rosas is a 37 y o  female who presents for annual well woman exam    LMP - 5/15/23  Periods have been irregular the last 5 mths  In [facility] April got 2 cycles and since then has been lasting 10-12 days with a lot of heavy painful bleeding  US done w PCP showed fibroids  Jasmyn Perry MA present to translate for us  We reviewed options for tx including medical management vs surgical tx  She does have h/o LASHA  Pt most interested in OCPs  We reviewed risks of combo OCPs  Desires trial of POP  No vulvar itch/burn; No vaginal itch/burn; No abn discharge or odor; No urinary sx - burning/pain/frequency/hematuria    (+) SBEs - no breast masses, asymmetry, nipple discharge or bleeding, changes in skin of breast, or breast tenderness bilaterally    No abd/pelvic pain or HAs; No menopausal symptoms: No hot flashes/night sweats, problems with intercourse, vaginal dryness; sleeping well  Pt is sexually active in a mutually monog/ sexual relationship x 23 yrs; No issues with intercourse; She declines sti/hiv/hep testing; Feels safe at home    Contraception: vasectomy   (+) PCP for routine Bw/care;     Last Pap - approx 2 years  History of abnormal Pap smear: denies   Last mammo - 2/9/23 BIRADs 1   History of abnormal mammogram: denies   Last colonoscopy -  WNL       Review of Systems   Constitutional: Negative  Respiratory: Negative  Gastrointestinal: Negative  Endocrine: Negative  Genitourinary: Negative          The following portions of the patient's history were reviewed and updated as appropriate: allergies, current medications, past family history, past medical history, past social history, past surgical history and problem list          OB History        1    Para   1    Term   1            AB        Living   1       SAB        IAB        Ectopic        Multiple        Live Births   1                 Past Medical History:   Diagnosis Date   • Anemia    • Asthma    • Genital warts     Went i was pregnan   • Migraine        Past Surgical History:   Procedure Laterality Date   • ANKLE ARTHROSCOPY     • BARIATRIC SURGERY      Estel Pean by pass   • GASTRIC BYPASS LAPAROSCOPIC         Family History   Problem Relation Age of Onset   • Diabetes Mother    • Thyroid disease Mother    • No Known Problems Sister    • No Known Problems Daughter    • Breast cancer Maternal Grandmother    • Colon cancer Maternal Grandmother    • No Known Problems Maternal Grandfather    • No Known Problems Paternal Grandmother    • No Known Problems Paternal Grandfather    • No Known Problems Maternal Aunt    • No Known Problems Maternal Aunt    • No Known Problems Maternal Aunt    • No Known Problems Maternal Aunt    • No Known Problems Maternal Aunt    • No Known Problems Maternal Aunt    • No Known Problems Maternal Aunt        Social History     Socioeconomic History   • Marital status: /Civil Union     Spouse name: Not on file   • Number of children: Not on file   • Years of education: Not on file   • Highest education level: Not on file   Occupational History   • Not on file   Tobacco Use   • Smoking status: Never   • Smokeless tobacco: Never   Vaping Use   • Vaping Use: Never used   Substance and Sexual Activity • Alcohol use: Yes     Comment: Social   • Drug use: Never   • Sexual activity: Yes     Partners: Male     Birth control/protection: Male Sterilization   Other Topics Concern   • Not on file   Social History Narrative   • Not on file     Social Determinants of Health     Financial Resource Strain: Not on file   Food Insecurity: Not on file   Transportation Needs: Not on file   Physical Activity: Not on file   Stress: Not on file   Social Connections: Not on file   Intimate Partner Violence: Not on file   Housing Stability: Not on file         Current Outpatient Medications:   •  azelastine (OPTIVAR) 0 05 % ophthalmic solution, Administer 1 drop to both eyes 2 (two) times a day, Disp: 6 mL, Rfl: 0  •  Cholecalciferol (Vitamin D-3) 125 MCG (5000 UT) TABS, Take 1 tablet by mouth in the morning, Disp: 90 tablet, Rfl: 0  •  cyanocobalamin (VITAMIN B-12) 100 mcg tablet, Take by mouth daily, Disp: , Rfl:   •  cyclobenzaprine (FLEXERIL) 10 mg tablet, Take 1 tablet (10 mg total) by mouth daily at bedtime, Disp: 30 tablet, Rfl: 0  •  ferrous sulfate 325 (65 FE) MG EC tablet, Take 1 tablet (325 mg total) by mouth daily with breakfast, Disp: 1 tablet, Rfl: 0  •  folic acid (KP Folic Acid) 1 mg tablet, Take 1 tablet (1 mg total) by mouth daily, Disp: 90 tablet, Rfl: 1  •  Multiple Vitamin (multivitamin) capsule, Take 1 capsule by mouth daily, Disp: , Rfl:   •  norethindrone (Ortho Micronor) 0 35 MG tablet, Take 1 tablet (0 35 mg total) by mouth daily, Disp: 28 tablet, Rfl: 3  •  triamcinolone (KENALOG) 0 1 % cream, Apply topically 2 (two) times a day, Disp: 30 g, Rfl: 0  •  acetaminophen (TYLENOL) 650 mg CR tablet, Take 1 tablet (650 mg total) by mouth every 8 (eight) hours as needed for mild pain (Patient not taking: Reported on 6/7/2023), Disp: 30 tablet, Rfl: 0  •  albuterol (Ventolin HFA) 90 mcg/act inhaler, Inhale 2 puffs every 6 (six) hours as needed for wheezing (Patient not taking: Reported on 6/7/2023), Disp: 18 g, Rfl: 0  •  fluticasone (FLONASE) 50 mcg/act nasal spray, 2 sprays into each nostril daily (Patient not taking: Reported on 6/7/2023), Disp: 48 mL, Rfl: 1  •  omeprazole (PriLOSEC) 20 mg delayed release capsule, Take 1 capsule (20 mg total) by mouth daily (Patient not taking: Reported on 6/7/2023), Disp: 90 capsule, Rfl: 1    Allergies   Allergen Reactions   • Shellfish Allergy - Food Allergy Dermatitis, Itching, Palpitations, Rash, Shortness Of Breath, Swelling, Throat Swelling and Vomiting   • Contrast [Iodinated Contrast Media] Hives       Objective   Vitals:    06/07/23 0941   BP: 124/80   BP Location: Left arm   Patient Position: Sitting   Cuff Size: Large   Weight: 111 kg (245 lb)     Physical Exam  Vitals reviewed  HENT:      Head: Normocephalic and atraumatic  Cardiovascular:      Rate and Rhythm: Normal rate and regular rhythm  Pulmonary:      Effort: Pulmonary effort is normal       Breath sounds: Normal breath sounds  Chest:   Breasts:     Breasts are symmetrical       Right: No swelling, bleeding, inverted nipple, mass, nipple discharge, skin change or tenderness  Left: No swelling, bleeding, inverted nipple, mass, nipple discharge, skin change or tenderness  Abdominal:      General: Abdomen is flat  Bowel sounds are normal       Palpations: Abdomen is soft  Tenderness: There is no abdominal tenderness  There is no right CVA tenderness, left CVA tenderness or guarding  Genitourinary:     General: Normal vulva  Pubic Area: No rash  Labia:         Right: No rash, tenderness, lesion or injury  Left: No rash, tenderness, lesion or injury  Urethra: No prolapse, urethral pain, urethral swelling or urethral lesion  Vagina: Normal  No signs of injury and foreign body  No vaginal discharge or erythema  Cervix: Normal       Uterus: Normal        Adnexa: Right adnexa normal and left adnexa normal       Comments:  With multiple vagianal varicosities noted throughout b/l labia majora  1 skin tag present left groin fold present and asx for many years per pt  Musculoskeletal:      Cervical back: Neck supple  Lymphadenopathy:      Upper Body:      Right upper body: No axillary adenopathy  Left upper body: No axillary adenopathy  Skin:     General: Skin is warm and dry  Neurological:      Mental Status: She is alert and oriented to person, place, and time  Psychiatric:         Mood and Affect: Mood normal          Behavior: Behavior normal          Thought Content: Thought content normal          Judgment: Judgment normal          There are no Patient Instructions on file for this visit

## 2023-06-13 DIAGNOSIS — J45.20 MILD INTERMITTENT ASTHMA WITHOUT COMPLICATION: ICD-10-CM

## 2023-06-13 RX ORDER — ALBUTEROL SULFATE 90 UG/1
AEROSOL, METERED RESPIRATORY (INHALATION)
Qty: 18 G | Refills: 0 | Status: SHIPPED | OUTPATIENT
Start: 2023-06-13

## 2023-06-14 LAB
CLINICAL INFO: NORMAL
CYTO CVX: NORMAL
CYTOLOGY CMNT CVX/VAG CYTO-IMP: NORMAL
DATE PREVIOUS BX: NORMAL
HPV E6+E7 MRNA CVX QL NAA+PROBE: NOT DETECTED
LMP START DATE: NORMAL
SL AMB PREV. PAP:: NORMAL
SPECIMEN SOURCE CVX/VAG CYTO: NORMAL

## 2023-07-18 DIAGNOSIS — J45.20 MILD INTERMITTENT ASTHMA WITHOUT COMPLICATION: ICD-10-CM

## 2023-07-18 RX ORDER — ALBUTEROL SULFATE 90 UG/1
AEROSOL, METERED RESPIRATORY (INHALATION)
Qty: 8 G | Refills: 0 | Status: SHIPPED | OUTPATIENT
Start: 2023-07-18

## 2023-08-16 ENCOUNTER — PATIENT MESSAGE (OUTPATIENT)
Dept: OBGYN CLINIC | Facility: CLINIC | Age: 43
End: 2023-08-16

## 2023-08-27 DIAGNOSIS — J45.20 MILD INTERMITTENT ASTHMA WITHOUT COMPLICATION: ICD-10-CM

## 2023-08-28 DIAGNOSIS — E53.8 FOLIC ACID DEFICIENCY: ICD-10-CM

## 2023-08-28 RX ORDER — ALBUTEROL SULFATE 90 UG/1
AEROSOL, METERED RESPIRATORY (INHALATION)
Qty: 6.7 G | Refills: 0 | Status: SHIPPED | OUTPATIENT
Start: 2023-08-28

## 2023-08-28 RX ORDER — FOLIC ACID 1 MG/1
1000 TABLET ORAL DAILY
Qty: 30 TABLET | Refills: 5 | Status: SHIPPED | OUTPATIENT
Start: 2023-08-28

## 2023-08-28 NOTE — PATIENT COMMUNICATION
Patient was seen on 6/7/23 by Jaycob Wiggins - Will plan 3 mth pill check to review options if sx are not improved. Pt hesitant to utilize IUD due to bad experience w family member. Patient is scheduled on 9/27/23 with Dr. Murali Szymanski. Will route to provider to review - does her appointment need to be moved up?

## 2023-09-25 ENCOUNTER — APPOINTMENT (OUTPATIENT)
Dept: LAB | Facility: CLINIC | Age: 43
End: 2023-09-25
Payer: COMMERCIAL

## 2023-09-25 DIAGNOSIS — E55.9 VITAMIN D DEFICIENCY: ICD-10-CM

## 2023-09-25 DIAGNOSIS — R10.2 PELVIC PAIN IN FEMALE: ICD-10-CM

## 2023-09-25 DIAGNOSIS — E53.8 B12 DEFICIENCY: ICD-10-CM

## 2023-09-25 LAB
25(OH)D3 SERPL-MCNC: 40.5 NG/ML (ref 30–100)
ALBUMIN SERPL BCP-MCNC: 4.1 G/DL (ref 3.5–5)
ALP SERPL-CCNC: 57 U/L (ref 34–104)
ALT SERPL W P-5'-P-CCNC: 16 U/L (ref 7–52)
ANION GAP SERPL CALCULATED.3IONS-SCNC: 11 MMOL/L
AST SERPL W P-5'-P-CCNC: 25 U/L (ref 13–39)
BASOPHILS # BLD AUTO: 0.04 THOUSANDS/ÂΜL (ref 0–0.1)
BASOPHILS NFR BLD AUTO: 1 % (ref 0–1)
BILIRUB SERPL-MCNC: 0.43 MG/DL (ref 0.2–1)
BUN SERPL-MCNC: 12 MG/DL (ref 5–25)
CALCIUM SERPL-MCNC: 8.9 MG/DL (ref 8.4–10.2)
CHLORIDE SERPL-SCNC: 101 MMOL/L (ref 96–108)
CO2 SERPL-SCNC: 23 MMOL/L (ref 21–32)
CREAT SERPL-MCNC: 0.71 MG/DL (ref 0.6–1.3)
EOSINOPHIL # BLD AUTO: 0.13 THOUSAND/ÂΜL (ref 0–0.61)
EOSINOPHIL NFR BLD AUTO: 2 % (ref 0–6)
ERYTHROCYTE [DISTWIDTH] IN BLOOD BY AUTOMATED COUNT: 12.7 % (ref 11.6–15.1)
GFR SERPL CREATININE-BSD FRML MDRD: 104 ML/MIN/1.73SQ M
GLUCOSE P FAST SERPL-MCNC: 93 MG/DL (ref 65–99)
HCT VFR BLD AUTO: 43.1 % (ref 34.8–46.1)
HGB BLD-MCNC: 13.9 G/DL (ref 11.5–15.4)
IMM GRANULOCYTES # BLD AUTO: 0.01 THOUSAND/UL (ref 0–0.2)
IMM GRANULOCYTES NFR BLD AUTO: 0 % (ref 0–2)
LYMPHOCYTES # BLD AUTO: 1.76 THOUSANDS/ÂΜL (ref 0.6–4.47)
LYMPHOCYTES NFR BLD AUTO: 28 % (ref 14–44)
MCH RBC QN AUTO: 29.6 PG (ref 26.8–34.3)
MCHC RBC AUTO-ENTMCNC: 32.3 G/DL (ref 31.4–37.4)
MCV RBC AUTO: 92 FL (ref 82–98)
MONOCYTES # BLD AUTO: 0.5 THOUSAND/ÂΜL (ref 0.17–1.22)
MONOCYTES NFR BLD AUTO: 8 % (ref 4–12)
NEUTROPHILS # BLD AUTO: 3.77 THOUSANDS/ÂΜL (ref 1.85–7.62)
NEUTS SEG NFR BLD AUTO: 61 % (ref 43–75)
NRBC BLD AUTO-RTO: 0 /100 WBCS
PLATELET # BLD AUTO: 295 THOUSANDS/UL (ref 149–390)
PMV BLD AUTO: 11.4 FL (ref 8.9–12.7)
POTASSIUM SERPL-SCNC: 4.5 MMOL/L (ref 3.5–5.3)
PROT SERPL-MCNC: 7.8 G/DL (ref 6.4–8.4)
RBC # BLD AUTO: 4.7 MILLION/UL (ref 3.81–5.12)
SODIUM SERPL-SCNC: 135 MMOL/L (ref 135–147)
VIT B12 SERPL-MCNC: 822 PG/ML (ref 180–914)
WBC # BLD AUTO: 6.21 THOUSAND/UL (ref 4.31–10.16)

## 2023-09-25 PROCEDURE — 82306 VITAMIN D 25 HYDROXY: CPT

## 2023-09-25 PROCEDURE — 36415 COLL VENOUS BLD VENIPUNCTURE: CPT

## 2023-09-25 PROCEDURE — 82607 VITAMIN B-12: CPT

## 2023-09-25 PROCEDURE — 85025 COMPLETE CBC W/AUTO DIFF WBC: CPT

## 2023-09-25 PROCEDURE — 80053 COMPREHEN METABOLIC PANEL: CPT

## 2023-09-27 ENCOUNTER — OFFICE VISIT (OUTPATIENT)
Dept: OBGYN CLINIC | Facility: CLINIC | Age: 43
End: 2023-09-27
Payer: COMMERCIAL

## 2023-09-27 VITALS
SYSTOLIC BLOOD PRESSURE: 112 MMHG | HEIGHT: 65 IN | BODY MASS INDEX: 39.99 KG/M2 | DIASTOLIC BLOOD PRESSURE: 74 MMHG | WEIGHT: 240 LBS

## 2023-09-27 DIAGNOSIS — D21.9 FIBROID: ICD-10-CM

## 2023-09-27 DIAGNOSIS — N93.9 ABNORMAL UTERINE BLEEDING (AUB): Primary | ICD-10-CM

## 2023-09-27 DIAGNOSIS — N92.1 MENORRHAGIA WITH IRREGULAR CYCLE: ICD-10-CM

## 2023-09-27 PROCEDURE — 99214 OFFICE O/P EST MOD 30 MIN: CPT | Performed by: STUDENT IN AN ORGANIZED HEALTH CARE EDUCATION/TRAINING PROGRAM

## 2023-09-27 RX ORDER — TRANEXAMIC ACID 650 MG/1
1300 TABLET ORAL 3 TIMES DAILY
Qty: 30 TABLET | Refills: 0 | Status: SHIPPED | OUTPATIENT
Start: 2023-09-27 | End: 2023-10-02

## 2023-09-27 NOTE — PROGRESS NOTES
Assessment/Plan:  Abran Gonzalez is a 37 y.o.  with AUB-L who presents for consultation    1. Abnormal uterine bleeding (AUB)  Tranexamic Acid 650 MG TABS      2. Fibroid        3. Menorrhagia with regular cycle           • Extensively reviewed options for management of AUB including expectant, medical, and surgical options. We specifically discussed POPs (can try Slynd--failed norethindrone), Depo (nervous about side effects and fer gain), Nexplanon (doesn't want to try), LNG-IUD (high risk of expulsion/malpositioning with fibroid), high-dose ibuprofen (cant due to bypass), Lysteda, [place], endometrial ablation (less helpful with fibroids), myomectomy, hysterectomy. • Patients opts for Lysteda at this time  • RTO endometrial biopsy  • All questions answered to the best of my ability    Subjective:      Patient ID: Abran Gonzalez is a 37 y.o. female. HPI    irregular the last 5 mths. In [facility] April got 2 cycles and since then has been lasting 10-12 days with a lot of heavy painful bleeding.    Fibromas  Started POP but made her feel very bad--nausea, dizzy, diarrhea, and didn't help with bleeding  Bleeding worse almost daily  Took them for an entire month, didn't want to continue  Has not taken the pill for the past 1.5 months  In august had it for 4 days  In September 9 days  Has cramping with period     had vasectomy  No tobacco use  Migraines, history, with aura  No person hx of DVT/VTE/stroke, MI  No chtn    1xSVD    The following portions of the patient's history were reviewed and updated as appropriate: allergies, current medications, past medical history, past social history, past surgical history and problem list.    Review of Systems  --per HPI    Objective:  /74 (BP Location: Left arm, Patient Position: Sitting, Cuff Size: Large)   Ht 5' 5" (1.651 m)   Wt 109 kg (240 lb)   LMP 2023   BMI 39.94 kg/m²      Physical Exam  Constitutional:       Appearance: Normal appearance. HENT:      Head: Normocephalic and atraumatic. Eyes:      Extraocular Movements: Extraocular movements intact. Conjunctiva/sclera: Conjunctivae normal.   Pulmonary:      Effort: Pulmonary effort is normal.   Musculoskeletal:         General: Normal range of motion. Cervical back: Normal range of motion. Neurological:      General: No focal deficit present. Mental Status: She is alert. Psychiatric:         Mood and Affect: Mood normal.         Behavior: Behavior normal.         Thought Content:  Thought content normal.

## 2023-10-03 ENCOUNTER — OFFICE VISIT (OUTPATIENT)
Dept: FAMILY MEDICINE CLINIC | Facility: CLINIC | Age: 43
End: 2023-10-03
Payer: COMMERCIAL

## 2023-10-03 VITALS
SYSTOLIC BLOOD PRESSURE: 118 MMHG | RESPIRATION RATE: 16 BRPM | BODY MASS INDEX: 39.99 KG/M2 | DIASTOLIC BLOOD PRESSURE: 66 MMHG | HEART RATE: 70 BPM | WEIGHT: 240 LBS | OXYGEN SATURATION: 97 % | HEIGHT: 65 IN

## 2023-10-03 DIAGNOSIS — D50.9 IRON DEFICIENCY ANEMIA, UNSPECIFIED IRON DEFICIENCY ANEMIA TYPE: ICD-10-CM

## 2023-10-03 DIAGNOSIS — Z23 NEEDS FLU SHOT: ICD-10-CM

## 2023-10-03 DIAGNOSIS — E55.9 VITAMIN D DEFICIENCY: ICD-10-CM

## 2023-10-03 DIAGNOSIS — H10.9 CONJUNCTIVITIS OF BOTH EYES, UNSPECIFIED CONJUNCTIVITIS TYPE: ICD-10-CM

## 2023-10-03 DIAGNOSIS — E53.8 B12 DEFICIENCY: ICD-10-CM

## 2023-10-03 DIAGNOSIS — L30.9 DERMATITIS: Primary | ICD-10-CM

## 2023-10-03 DIAGNOSIS — Z13.6 SCREENING FOR CARDIOVASCULAR CONDITION: ICD-10-CM

## 2023-10-03 DIAGNOSIS — J30.1 SEASONAL ALLERGIC RHINITIS DUE TO POLLEN: ICD-10-CM

## 2023-10-03 PROCEDURE — 90686 IIV4 VACC NO PRSV 0.5 ML IM: CPT | Performed by: FAMILY MEDICINE

## 2023-10-03 PROCEDURE — 99214 OFFICE O/P EST MOD 30 MIN: CPT | Performed by: FAMILY MEDICINE

## 2023-10-03 PROCEDURE — 90471 IMMUNIZATION ADMIN: CPT | Performed by: FAMILY MEDICINE

## 2023-10-03 RX ORDER — FLUTICASONE PROPIONATE 50 MCG
2 SPRAY, SUSPENSION (ML) NASAL DAILY
Qty: 48 ML | Refills: 1 | Status: SHIPPED | OUTPATIENT
Start: 2023-10-03

## 2023-10-03 RX ORDER — LANOLIN ALCOHOL/MO/W.PET/CERES
325 CREAM (GRAM) TOPICAL
Qty: 1 TABLET | Refills: 2 | Status: SHIPPED | OUTPATIENT
Start: 2023-10-03 | End: 2023-10-03

## 2023-10-03 RX ORDER — AZELASTINE HYDROCHLORIDE 0.5 MG/ML
1 SOLUTION/ DROPS OPHTHALMIC 2 TIMES DAILY
Qty: 6 ML | Refills: 1 | Status: SHIPPED | OUTPATIENT
Start: 2023-10-03

## 2023-10-03 RX ORDER — TRIAMCINOLONE ACETONIDE 1 MG/G
CREAM TOPICAL 2 TIMES DAILY
Qty: 30 G | Refills: 0 | Status: SHIPPED | OUTPATIENT
Start: 2023-10-03

## 2023-10-03 NOTE — PROGRESS NOTES
Name: Brandon Williamson      : 1980      MRN: [id number]  Encounter Provider: Torres De La Rosa MD  Encounter Date: 10/3/2023   Encounter department: [address] [place] [address]     1. Dermatitis  Assessment & Plan:  Dry scaly skin on rt hand diane area , medial part , use topical steroid prn     Orders:  -     triamcinolone (KENALOG) 0.1 % cream; Apply topically 2 (two) times a day  -     CBC and differential; Future; Expected date: 2024    2. Needs flu shot  -     influenza vaccine, quadrivalent, 0.5 mL, preservative-free, for adult and pediatric patients 6 mos+ (AFLURIA, FLUARIX, FLULAVAL, FLUZONE)    3. Conjunctivitis of both eyes, unspecified conjunctivitis type  -     azelastine (OPTIVAR) 0.05 % ophthalmic solution; Administer 1 drop to both eyes 2 (two) times a day    4. Iron deficiency anemia, unspecified iron deficiency anemia type  -     ferrous sulfate 325 (65 FE) MG EC tablet; Take 1 tablet (325 mg total) by mouth daily with breakfast  -     CBC and differential; Future; Expected date: 2024  -     Comprehensive metabolic panel; Future; Expected date: 2024    5. Seasonal allergic rhinitis due to pollen  Assessment & Plan:  She will use the Flonase as needed    Orders:  -     fluticasone (FLONASE) 50 mcg/act nasal spray; 2 sprays into each nostril daily    6. B12 deficiency  -     Vitamin B12; Future; Expected date: 2024    7. Vitamin D deficiency  -     Vitamin D 25 hydroxy; Future; Expected date: 2024    8. Screening for cardiovascular condition  -     Lipid panel; Future; Expected date: 2024  -     TSH, 3rd generation;  Future; Expected date: 2024           Subjective     Follow up , has asthma stable , gerd ,pelvic pain , meorrhagia , seeing gynecologist , on transemic acid , feels fine now ,   She gets monthly heavy periods but better now ,   She has history of gastric surgery and she is on supplements    Review of Systems Constitutional: Negative for activity change, appetite change, chills, fatigue, fever and unexpected weight change. HENT: Negative for congestion, ear discharge, ear pain, nosebleeds, postnasal drip, rhinorrhea, sinus pressure, sneezing, sore throat, trouble swallowing and voice change. Eyes: Negative for photophobia, pain, discharge, redness and itching. Respiratory: Negative for cough, chest tightness, shortness of breath and wheezing. Cardiovascular: Negative for chest pain, palpitations and leg swelling. Gastrointestinal: Negative for abdominal pain, constipation, diarrhea, nausea and vomiting. Endocrine: Negative for polyuria. Genitourinary: Negative for dysuria, frequency and urgency. Musculoskeletal: Negative for arthralgias, back pain, myalgias and neck pain. Skin: Negative for color change, pallor and rash. Allergic/Immunologic: Negative for environmental allergies and food allergies. Neurological: Negative for dizziness, weakness, light-headedness and headaches. Hematological: Negative for adenopathy. Does not bruise/bleed easily. Psychiatric/Behavioral: Negative for behavioral problems. The patient is not nervous/anxious.         Past Medical History:   Diagnosis Date   • Anemia    • Asthma    • Genital warts 2003    Went i was pregnan   • Migraine 1999     Past Surgical History:   Procedure Laterality Date   • ANKLE ARTHROSCOPY     • BARIATRIC SURGERY  2009    [facility]k by pass   • GASTRIC BYPASS LAPAROSCOPIC       Family History   Problem Relation Age of Onset   • Diabetes Mother    • Thyroid disease Mother    • No Known Problems Sister    • No Known Problems Daughter    • Breast cancer Maternal Grandmother    • Colon cancer Maternal Grandmother    • No Known Problems Maternal Grandfather    • No Known Problems Paternal Grandmother    • No Known Problems Paternal Grandfather    • No Known Problems Maternal Aunt    • No Known Problems Maternal Aunt    • No Known Problems Maternal Aunt    • No Known Problems Maternal Aunt    • No Known Problems Maternal Aunt    • No Known Problems Maternal Aunt    • No Known Problems Maternal Aunt      Social History     Socioeconomic History   • Marital status: /Civil Union     Spouse name: None   • Number of children: None   • Years of education: None   • Highest education level: None   Occupational History   • None   Tobacco Use   • Smoking status: Never   • Smokeless tobacco: Never   Vaping Use   • Vaping Use: Never used   Substance and Sexual Activity   • Alcohol use: Yes     Comment: Social   • Drug use: Never   • Sexual activity: Yes     Partners: Male     Birth control/protection: Male Sterilization   Other Topics Concern   • None   Social History Narrative   • None     Social Determinants of Health     Financial Resource Strain: Not on file   Food Insecurity: Not on file   Transportation Needs: Not on file   Physical Activity: Not on file   Stress: Not on file   Social Connections: Not on file   Intimate Partner Violence: Not on file   Housing Stability: Not on file     Current Outpatient Medications on File Prior to Visit   Medication Sig   • acetaminophen (TYLENOL) 650 mg CR tablet Take 1 tablet (650 mg total) by mouth every 8 (eight) hours as needed for mild pain   • albuterol (PROVENTIL HFA,VENTOLIN HFA) 90 mcg/act inhaler INHALE 2 PUFFS BY MOUTH EVERY 6 HOURS AS NEEDED FOR WHEEZE   • Cholecalciferol (Vitamin D-3) 125 MCG (5000 UT) TABS Take 1 tablet by mouth in the morning   • cyanocobalamin (VITAMIN B-12) 100 mcg tablet Take by mouth daily   • cyclobenzaprine (FLEXERIL) 10 mg tablet Take 1 tablet (10 mg total) by mouth daily at bedtime   • folic acid (FOLVITE) 1 mg tablet TAKE 1 TABLET BY MOUTH EVERY DAY   • Multiple Vitamin (multivitamin) capsule Take 1 capsule by mouth daily   • omeprazole (PriLOSEC) 20 mg delayed release capsule Take 1 capsule (20 mg total) by mouth daily   • [] Tranexamic Acid 650 MG TABS Take 2 tablets (1,300 mg total) by mouth 3 (three) times a day for 5 days   • [DISCONTINUED] azelastine (OPTIVAR) 0.05 % ophthalmic solution Administer 1 drop to both eyes 2 (two) times a day   • [DISCONTINUED] ferrous sulfate 325 (65 FE) MG EC tablet Take 1 tablet (325 mg total) by mouth daily with breakfast   • [DISCONTINUED] fluticasone (FLONASE) 50 mcg/act nasal spray 2 sprays into each nostril daily (Patient not taking: Reported on 6/7/2023)   • [DISCONTINUED] norethindrone (MICRONOR) 0.35 MG tablet TAKE 1 TABLET BY MOUTH EVERY DAY (Patient not taking: Reported on 9/27/2023)   • [DISCONTINUED] triamcinolone (KENALOG) 0.1 % cream Apply topically 2 (two) times a day     Allergies   Allergen Reactions   • Shellfish Allergy - Food Allergy Dermatitis, Itching, Palpitations, Rash, Shortness Of Breath, Swelling, Throat Swelling and Vomiting   • Contrast [Iodinated Contrast Media] Hives     Immunization History   Administered Date(s) Administered   • COVID-19 PFIZER VACCINE 0.3 ML IM 05/05/2021, 05/26/2021   • INFLUENZA 10/23/2013, 10/10/2017, 09/13/2018, 11/13/2019, 10/27/2020, 10/20/2021, 01/03/2023   • Influenza, injectable, quadrivalent, preservative free 0.5 mL 10/03/2023   • Influenza, recombinant, quadrivalent,injectable, preservative free 01/03/2023   • Tdap 09/13/2018       Objective     /66   Pulse 70   Resp 16   Ht 5' 5" (1.651 m)   Wt 109 kg (240 lb)   LMP 09/06/2023   SpO2 97%   BMI 39.94 kg/m²     Physical Exam  Vitals and nursing note reviewed. Constitutional:       Appearance: Normal appearance. She is well-developed. She is not ill-appearing. HENT:      Head: Normocephalic and atraumatic. Right Ear: External ear normal.      Left Ear: External ear normal.      Nose: Nose normal.      Mouth/Throat:      Pharynx: No oropharyngeal exudate. Eyes:      General: No scleral icterus. Right eye: No discharge. Left eye: No discharge.       Conjunctiva/sclera: Conjunctivae normal. Pupils: Pupils are equal, round, and reactive to light. Neck:      Thyroid: No thyromegaly. Trachea: No tracheal deviation. Cardiovascular:      Rate and Rhythm: Normal rate and regular rhythm. Heart sounds: Normal heart sounds. No murmur heard. Pulmonary:      Effort: Pulmonary effort is normal. No respiratory distress. Breath sounds: Normal breath sounds. No wheezing or rales. Abdominal:      General: Bowel sounds are normal. There is no distension. Palpations: Abdomen is soft. There is no mass. Tenderness: There is no abdominal tenderness. There is no rebound. Musculoskeletal:         General: Normal range of motion. Cervical back: Normal range of motion and neck supple. Lymphadenopathy:      Cervical: No cervical adenopathy. Skin:     General: Skin is warm. Coloration: Skin is not pale. Findings: No erythema or rash. Neurological:      Mental Status: She is alert and oriented to person, place, and time. Cranial Nerves: No cranial nerve deficit. Deep Tendon Reflexes: Reflexes are normal and symmetric. Psychiatric:         Behavior: Behavior normal.         Thought Content:  Thought content normal.         Judgment: Judgment normal.       Izzy Zambrano MD

## 2023-10-27 DIAGNOSIS — D50.9 IRON DEFICIENCY ANEMIA, UNSPECIFIED IRON DEFICIENCY ANEMIA TYPE: ICD-10-CM

## 2023-10-27 DIAGNOSIS — H10.9 CONJUNCTIVITIS OF BOTH EYES, UNSPECIFIED CONJUNCTIVITIS TYPE: ICD-10-CM

## 2023-10-27 RX ORDER — LANOLIN ALCOHOL/MO/W.PET/CERES
1 CREAM (GRAM) TOPICAL
Qty: 90 TABLET | Refills: 1 | Status: SHIPPED | OUTPATIENT
Start: 2023-10-27

## 2023-10-27 RX ORDER — AZELASTINE HYDROCHLORIDE 0.5 MG/ML
SOLUTION/ DROPS OPHTHALMIC
Qty: 18 ML | Refills: 1 | Status: SHIPPED | OUTPATIENT
Start: 2023-10-27

## 2023-11-29 NOTE — PROGRESS NOTES
Assessment/Plan:  Ashok Campbell is a 37 y.o.  with AUB, fibroids and cervical polyp who presents for follow-up    No problem-specific Assessment & Plan notes found for this encounter. Diagnoses and all orders for this visit:    Menorrhagia with irregular cycle    Cervical stenosis (uterine cervix)  -     miSOPROStol (Cytotec) 200 mcg tablet; Take 1 tablet (200 mcg total) by mouth 1 (one) time for 1 dose Crush tablet and place in vagina the night before procedure    Cervical polyp        Cervical polyp removed successfully and sent to pathology  Unable to perform endometrial biopsy 2/2 cervical stenosis, discussed misoprostol per vagina prior to repeat attempt  Discussed possibility of [address], D&C +/- LNG-IUD as alternative  Would like to attempt endometrial biopsy (not ready for Mirena IUD at this time)    Subjective:      Patient ID: Ashok Campbell is a 37 y.o. female. HPI  Seen 2023  Reviewed AUB options, declined hormonal management, accepted Lysteda  Hasn't tried lysteda yet  Last period not as heavy, but had it twice last month  - and then -  Lots of pain with menses  Took motrin (limited because of bypass) and chamomille tea and that helped a bit    The following portions of the patient's history were reviewed and updated as appropriate: allergies, current medications, past medical history, past social history, past surgical history, and problem list.    Review of Systems --per HPI    Objective:  /74 (BP Location: Left arm, Patient Position: Sitting, Cuff Size: Large)   Ht 5' 5" (1.651 m)   Wt 107 kg (235 lb)   LMP 2023   BMI 39.11 kg/m²        Physical Exam  Constitutional:       Appearance: Normal appearance. HENT:      Head: Normocephalic. Eyes:      Extraocular Movements: Extraocular movements intact.       Conjunctiva/sclera: Conjunctivae normal.   Pulmonary:      Effort: Pulmonary effort is normal.   Genitourinary:     Comments: Vulva: normal, no lesions  Vagina: normal, no lesions or ttp  Urethra: normal, no lesions, masses or ttp  Bladder: normal, no masses or ttp  Cervix: stenotic appearing, small cervical polyp, removed  Uterus: 10-week size, homogenous, nonttp  Adnexa: no masses or ttp    Musculoskeletal:         General: Normal range of motion. Cervical back: Normal range of motion. Skin:     General: Skin is warm and dry. Neurological:      General: No focal deficit present. Mental Status: She is alert. Psychiatric:         Mood and Affect: Mood normal.         Behavior: Behavior normal.         Thought Content: Thought content normal.              Colposcopy     Date/Time  11/30/2023 3:30 PM     [place] Protocol   Procedure performed by:  Consent: Verbal consent obtained. Risks and benefits: risks, benefits and alternatives were discussed  Consent given by: patient  Time out: Immediately prior to procedure a "time out" was called to verify the correct patient, procedure, equipment, support staff and site/side marked as required. Patient understanding: patient states understanding of the procedure being performed  Required items: required blood products, implants, devices, and special equipment available     Performed by  Gonzalez Casillas MD   Authorized by  Gonzalez Casillas MD     Pre-procedure details      Pre-procedure timeout performed: yes     Procedure Details   Procedure: Biopsy of cervix only      [place] speculum was placed in the vagina: yes      Biopsy(s): yes      Location:  Endocervical polyp    Specimen to pathology: yes     Post-procedure      Findings: Polyps      Impression: Low grade cervical dysplasia       Endometrial biopsy    Date/Time: 11/30/2023 3:30 PM    Performed by: Gonzalez Casillas MD  Authorized by: Gonzalez Casillas MD  Universal Protocol:  Procedure performed by:  Consent: Verbal consent obtained.   Risks and benefits: risks, benefits and alternatives were discussed  Consent given by: patient  Time out: Immediately prior to procedure a "time out" was called to verify the correct patient, procedure, equipment, support staff and site/side marked as required. Patient understanding: patient states understanding of the procedure being performed  Required items: required blood products, implants, devices, and special equipment available  Patient identity confirmed: verbally with patient and provided demographic data    Indication:     Indications:  Other disorder of menstruation and other abnormal bleeding from female genital tract    Procedure:     Procedure: endometrial biopsy with P[place]      A bivalve speculum was placed in the vagina: yes      Cervix cleaned and prepped: yes      The cervix was dilated: no      Uterus sounded: no      Patient tolerated procedure well with no complications: no      Unable to perform due to: pain and cervical stenosis    Findings:     Uterus size:  9-10 weeks    Cervix: stenotic      Adnexa: normal

## 2023-11-30 ENCOUNTER — OFFICE VISIT (OUTPATIENT)
Dept: OBGYN CLINIC | Facility: CLINIC | Age: 43
End: 2023-11-30
Payer: COMMERCIAL

## 2023-11-30 VITALS
HEIGHT: 65 IN | DIASTOLIC BLOOD PRESSURE: 74 MMHG | SYSTOLIC BLOOD PRESSURE: 116 MMHG | BODY MASS INDEX: 39.15 KG/M2 | WEIGHT: 235 LBS

## 2023-11-30 DIAGNOSIS — N92.1 MENORRHAGIA WITH IRREGULAR CYCLE: Primary | ICD-10-CM

## 2023-11-30 DIAGNOSIS — N84.1 CERVICAL POLYP: ICD-10-CM

## 2023-11-30 DIAGNOSIS — N88.2 CERVICAL STENOSIS (UTERINE CERVIX): ICD-10-CM

## 2023-11-30 PROCEDURE — 99214 OFFICE O/P EST MOD 30 MIN: CPT | Performed by: STUDENT IN AN ORGANIZED HEALTH CARE EDUCATION/TRAINING PROGRAM

## 2023-11-30 PROCEDURE — 58100 BIOPSY OF UTERUS LINING: CPT | Performed by: STUDENT IN AN ORGANIZED HEALTH CARE EDUCATION/TRAINING PROGRAM

## 2023-11-30 RX ORDER — MISOPROSTOL 200 UG/1
200 TABLET ORAL ONCE
Qty: 1 TABLET | Refills: 0 | Status: SHIPPED | OUTPATIENT
Start: 2023-11-30 | End: 2023-11-30

## 2023-12-02 PROBLEM — Z13.6 SCREENING FOR CARDIOVASCULAR CONDITION: Status: RESOLVED | Noted: 2022-12-22 | Resolved: 2023-12-02

## 2023-12-02 PROBLEM — H10.9 CONJUNCTIVITIS OF BOTH EYES: Status: RESOLVED | Noted: 2022-12-22 | Resolved: 2023-12-02

## 2023-12-05 LAB
CLINICAL INFO: NORMAL
PATH REPORT.FINAL DX SPEC: NORMAL
SPECIMEN SOURCE: NORMAL

## 2023-12-23 DIAGNOSIS — D50.9 IRON DEFICIENCY ANEMIA, UNSPECIFIED IRON DEFICIENCY ANEMIA TYPE: ICD-10-CM

## 2023-12-26 RX ORDER — LANOLIN ALCOHOL/MO/W.PET/CERES
1 CREAM (GRAM) TOPICAL
Qty: 90 TABLET | Refills: 1 | Status: SHIPPED | OUTPATIENT
Start: 2023-12-26

## 2024-03-11 ENCOUNTER — PROCEDURE VISIT (OUTPATIENT)
Dept: OBGYN CLINIC | Facility: CLINIC | Age: 44
End: 2024-03-11
Payer: COMMERCIAL

## 2024-03-11 VITALS
WEIGHT: 232 LBS | BODY MASS INDEX: 38.65 KG/M2 | DIASTOLIC BLOOD PRESSURE: 80 MMHG | SYSTOLIC BLOOD PRESSURE: 106 MMHG | HEIGHT: 65 IN

## 2024-03-11 DIAGNOSIS — N93.9 ABNORMAL UTERINE BLEEDING (AUB): Primary | ICD-10-CM

## 2024-03-11 DIAGNOSIS — Z32.02 URINE PREGNANCY TEST NEGATIVE: ICD-10-CM

## 2024-03-11 DIAGNOSIS — D25.9 UTERINE LEIOMYOMA, UNSPECIFIED LOCATION: ICD-10-CM

## 2024-03-11 LAB — SL AMB POCT URINE HCG: NORMAL

## 2024-03-11 PROCEDURE — 81025 URINE PREGNANCY TEST: CPT | Performed by: STUDENT IN AN ORGANIZED HEALTH CARE EDUCATION/TRAINING PROGRAM

## 2024-03-11 PROCEDURE — 88305 TISSUE EXAM BY PATHOLOGIST: CPT | Performed by: STUDENT IN AN ORGANIZED HEALTH CARE EDUCATION/TRAINING PROGRAM

## 2024-03-11 PROCEDURE — 58100 BIOPSY OF UTERUS LINING: CPT | Performed by: STUDENT IN AN ORGANIZED HEALTH CARE EDUCATION/TRAINING PROGRAM

## 2024-03-11 NOTE — PROGRESS NOTES
"Endometrial biopsy    Date/Time: 3/11/2024 2:30 PM    Performed by: Mica Espinal MD  Authorized by: Mica Espinal MD  Universal Protocol:  Procedure performed by:  Consent: Verbal consent obtained.  Risks and benefits: risks, benefits and alternatives were discussed  Consent given by: patient  Time out: Immediately prior to procedure a \"time out\" was called to verify the correct patient, procedure, equipment, support staff and site/side marked as required.  Patient understanding: patient states understanding of the procedure being performed  Radiology Images displayed and confirmed. If images not available, report reviewed: imaging studies available  Required items: required blood products, implants, devices, and special equipment available  Patient identity confirmed: verbally with patient and provided demographic data    Indication:     Indications: Other disorder of menstruation and other abnormal bleeding from female genital tract    Procedure:     Procedure: endometrial biopsy with Pipelle      A bivalve speculum was placed in the vagina: yes      Cervix cleaned and prepped: yes      The cervix was dilated: yes      Uterus sounded: yes      Uterus sound depth (cm):  8    Curettes used:  2    Specimen collected: specimen collected and sent to pathology      Patient tolerated procedure well with no complications: yes    Findings:     Uterus size:  Non-gravid    Cervix: stenotic        "

## 2024-04-04 ENCOUNTER — OFFICE VISIT (OUTPATIENT)
Dept: FAMILY MEDICINE CLINIC | Facility: CLINIC | Age: 44
End: 2024-04-04
Payer: COMMERCIAL

## 2024-04-04 ENCOUNTER — APPOINTMENT (OUTPATIENT)
Dept: LAB | Facility: CLINIC | Age: 44
End: 2024-04-04
Payer: COMMERCIAL

## 2024-04-04 VITALS
RESPIRATION RATE: 16 BRPM | SYSTOLIC BLOOD PRESSURE: 120 MMHG | HEART RATE: 71 BPM | BODY MASS INDEX: 38.99 KG/M2 | DIASTOLIC BLOOD PRESSURE: 70 MMHG | OXYGEN SATURATION: 98 % | HEIGHT: 65 IN | WEIGHT: 234 LBS

## 2024-04-04 DIAGNOSIS — D50.9 IRON DEFICIENCY ANEMIA, UNSPECIFIED IRON DEFICIENCY ANEMIA TYPE: ICD-10-CM

## 2024-04-04 DIAGNOSIS — Z13.6 SCREENING FOR CARDIOVASCULAR CONDITION: ICD-10-CM

## 2024-04-04 DIAGNOSIS — E53.8 B12 DEFICIENCY: ICD-10-CM

## 2024-04-04 DIAGNOSIS — E55.9 VITAMIN D DEFICIENCY: ICD-10-CM

## 2024-04-04 DIAGNOSIS — K21.9 GASTROESOPHAGEAL REFLUX DISEASE WITHOUT ESOPHAGITIS: Primary | ICD-10-CM

## 2024-04-04 DIAGNOSIS — Z12.31 ENCOUNTER FOR SCREENING MAMMOGRAM FOR BREAST CANCER: ICD-10-CM

## 2024-04-04 DIAGNOSIS — L30.9 DERMATITIS: ICD-10-CM

## 2024-04-04 LAB
25(OH)D3 SERPL-MCNC: 50.5 NG/ML (ref 30–100)
ALBUMIN SERPL BCP-MCNC: 3.7 G/DL (ref 3.5–5)
ALP SERPL-CCNC: 54 U/L (ref 34–104)
ALT SERPL W P-5'-P-CCNC: 19 U/L (ref 7–52)
ANION GAP SERPL CALCULATED.3IONS-SCNC: 8 MMOL/L (ref 4–13)
AST SERPL W P-5'-P-CCNC: 30 U/L (ref 13–39)
BASOPHILS # BLD AUTO: 0.04 THOUSANDS/ÂΜL (ref 0–0.1)
BASOPHILS NFR BLD AUTO: 1 % (ref 0–1)
BILIRUB SERPL-MCNC: 0.53 MG/DL (ref 0.2–1)
BUN SERPL-MCNC: 12 MG/DL (ref 5–25)
CALCIUM SERPL-MCNC: 8.6 MG/DL (ref 8.4–10.2)
CHLORIDE SERPL-SCNC: 104 MMOL/L (ref 96–108)
CHOLEST SERPL-MCNC: 171 MG/DL
CO2 SERPL-SCNC: 26 MMOL/L (ref 21–32)
CREAT SERPL-MCNC: 0.61 MG/DL (ref 0.6–1.3)
EOSINOPHIL # BLD AUTO: 0.09 THOUSAND/ÂΜL (ref 0–0.61)
EOSINOPHIL NFR BLD AUTO: 1 % (ref 0–6)
ERYTHROCYTE [DISTWIDTH] IN BLOOD BY AUTOMATED COUNT: 13.8 % (ref 11.6–15.1)
GFR SERPL CREATININE-BSD FRML MDRD: 110 ML/MIN/1.73SQ M
GLUCOSE P FAST SERPL-MCNC: 84 MG/DL (ref 65–99)
HCT VFR BLD AUTO: 37.8 % (ref 34.8–46.1)
HDLC SERPL-MCNC: 91 MG/DL
HGB BLD-MCNC: 11.7 G/DL (ref 11.5–15.4)
IMM GRANULOCYTES # BLD AUTO: 0.02 THOUSAND/UL (ref 0–0.2)
IMM GRANULOCYTES NFR BLD AUTO: 0 % (ref 0–2)
LDLC SERPL CALC-MCNC: 68 MG/DL (ref 0–100)
LYMPHOCYTES # BLD AUTO: 1.25 THOUSANDS/ÂΜL (ref 0.6–4.47)
LYMPHOCYTES NFR BLD AUTO: 19 % (ref 14–44)
MCH RBC QN AUTO: 29.3 PG (ref 26.8–34.3)
MCHC RBC AUTO-ENTMCNC: 31 G/DL (ref 31.4–37.4)
MCV RBC AUTO: 95 FL (ref 82–98)
MONOCYTES # BLD AUTO: 0.49 THOUSAND/ÂΜL (ref 0.17–1.22)
MONOCYTES NFR BLD AUTO: 8 % (ref 4–12)
NEUTROPHILS # BLD AUTO: 4.63 THOUSANDS/ÂΜL (ref 1.85–7.62)
NEUTS SEG NFR BLD AUTO: 71 % (ref 43–75)
NONHDLC SERPL-MCNC: 80 MG/DL
NRBC BLD AUTO-RTO: 0 /100 WBCS
PLATELET # BLD AUTO: 229 THOUSANDS/UL (ref 149–390)
PMV BLD AUTO: 10.9 FL (ref 8.9–12.7)
POTASSIUM SERPL-SCNC: 4.1 MMOL/L (ref 3.5–5.3)
PROT SERPL-MCNC: 6.6 G/DL (ref 6.4–8.4)
RBC # BLD AUTO: 3.99 MILLION/UL (ref 3.81–5.12)
SODIUM SERPL-SCNC: 138 MMOL/L (ref 135–147)
TRIGL SERPL-MCNC: 59 MG/DL
TSH SERPL DL<=0.05 MIU/L-ACNC: 1.45 UIU/ML (ref 0.45–4.5)
VIT B12 SERPL-MCNC: 1445 PG/ML (ref 180–914)
WBC # BLD AUTO: 6.52 THOUSAND/UL (ref 4.31–10.16)

## 2024-04-04 PROCEDURE — 82607 VITAMIN B-12: CPT

## 2024-04-04 PROCEDURE — 80061 LIPID PANEL: CPT

## 2024-04-04 PROCEDURE — 84443 ASSAY THYROID STIM HORMONE: CPT

## 2024-04-04 PROCEDURE — 82306 VITAMIN D 25 HYDROXY: CPT

## 2024-04-04 PROCEDURE — 99214 OFFICE O/P EST MOD 30 MIN: CPT | Performed by: FAMILY MEDICINE

## 2024-04-04 PROCEDURE — 85025 COMPLETE CBC W/AUTO DIFF WBC: CPT

## 2024-04-04 PROCEDURE — 36415 COLL VENOUS BLD VENIPUNCTURE: CPT

## 2024-04-04 PROCEDURE — 80053 COMPREHEN METABOLIC PANEL: CPT

## 2024-04-04 RX ORDER — OMEPRAZOLE 20 MG/1
20 CAPSULE, DELAYED RELEASE ORAL DAILY
Qty: 90 CAPSULE | Refills: 1 | Status: SHIPPED | OUTPATIENT
Start: 2024-04-04

## 2024-04-04 NOTE — PROGRESS NOTES
Name: Narda Cook      : 1980      MRN: [id number]  Encounter Provider: Kelly Bernabe MD  Encounter Date: 2024   Encounter department: [facility]    Assessment & Plan     1. Gastroesophageal reflux disease without esophagitis  Assessment & Plan:  Really not taking any medication advised to start the omeprazole and see a gastroenterologist as she complains of a lot of gas abdominal discomfort intermittently her abdominal exam is benign today, no tenderness and no mass    Orders:  -     omeprazole (PriLOSEC) 20 mg delayed release capsule; Take 1 capsule (20 mg total) by mouth daily  -     Ambulatory Referral to Gastroenterology; Future    2. Encounter for screening mammogram for breast cancer  -     Mammo screening bilateral w 3d & cad; Future    3. Iron deficiency anemia, unspecified iron deficiency anemia type  Assessment & Plan:  She gets heavy period And she see gynecologist, advised to continue iron as her hemoglobin is slight low    Orders:  -     CBC and differential; Future; Expected date: 10/04/2024  -     Comprehensive metabolic panel; Future; Expected date: 10/04/2024    4. B12 deficiency  Assessment & Plan:  B12 level is high advised to stop taking B12 for 1 week and then start half the dose which she is taking, she is not sure what dose she is taking advised to call with the dose    Orders:  -     Vitamin B12; Future; Expected date: 10/04/2024    5. Vitamin D deficiency  Assessment & Plan:  Vitamin D level is normal, continue same dose of vitamin D 5000 daily             Subjective     Abdominal Pain  This is a new problem. The current episode started 1 to 4 weeks ago. The onset quality is gradual. The problem occurs every several days. The most recent episode lasted 3 days. The problem has been gradually worsening. The pain is located in the RUQ, epigastric region and periumbilical region. The pain is at a severity of 5/10. The quality of the pain is aching, burning  and sharp. The abdominal pain radiates to the RLQ, RUQ and chest. Associated symptoms include diarrhea and flatus. Pertinent negatives include no anorexia, arthralgias, belching, constipation, dysuria, fever, frequency, headaches, hematochezia, hematuria, melena, myalgias, nausea, vomiting or weight loss. The pain is aggravated by palpation. The pain is relieved by Liquids, palpation, passing flatus and recumbency. The treatment provided mild relief. Her past medical history is significant for abdominal surgery.   She says when she lays down sometimes she feels a lot of gas and sometimes a bulging on the right side of her abdomen near the bellybutton, but is not all the time and she is not taking omeprazole, she feels gassy, history of gastric bypass surgery,  She gets heavy menstruation and follows gynecologist, she is taking iron, B12 and vitamin D, she does not know what dose of B12 she is taking  Review of Systems   Constitutional: Negative.  Negative for fever and weight loss.   Eyes: Negative.    Gastrointestinal:  Positive for abdominal pain, diarrhea and flatus. Negative for anorexia, constipation, hematochezia, melena, nausea and vomiting.   Genitourinary:  Negative for dysuria, frequency and hematuria.   Musculoskeletal:  Negative for arthralgias and myalgias.   Neurological:  Negative for headaches.       Past Medical History:   Diagnosis Date   • Anemia    • Asthma    • Genital warts 2003    Went i was pregnan   • Migraine 1999     Past Surgical History:   Procedure Laterality Date   • ANKLE ARTHROSCOPY     • BARIATRIC SURGERY  2009    [place]   • GASTRIC BYPASS LAPAROSCOPIC       Family History   Problem Relation Age of Onset   • Diabetes Mother    • Thyroid disease Mother    • No Known Problems Sister    • No Known Problems Daughter    • Breast cancer Maternal Grandmother    • Colon cancer Maternal Grandmother    • No Known Problems Maternal Grandfather    • No Known Problems Paternal  Grandmother    • No Known Problems Paternal Grandfather    • No Known Problems Maternal Aunt    • No Known Problems Maternal Aunt    • No Known Problems Maternal Aunt    • No Known Problems Maternal Aunt    • No Known Problems Maternal Aunt    • No Known Problems Maternal Aunt    • No Known Problems Maternal Aunt      Social History     Socioeconomic History   • Marital status: /Civil Union     Spouse name: None   • Number of children: None   • Years of education: None   • Highest education level: None   Occupational History   • None   Tobacco Use   • Smoking status: Never   • Smokeless tobacco: Never   Vaping Use   • Vaping status: Never Used   Substance and Sexual Activity   • Alcohol use: Yes     Comment: Social   • Drug use: Never   • Sexual activity: Yes     Partners: Male     Birth control/protection: Male Sterilization   Other Topics Concern   • None   Social History Narrative   • None     Social Determinants of Health     Financial Resource Strain: Not on file   Food Insecurity: Not on file   Transportation Needs: Not on file   Physical Activity: Not on file   Stress: Not on file   Social Connections: Not on file   Intimate Partner Violence: Not on file   Housing Stability: Not on file     Current Outpatient Medications on File Prior to Visit   Medication Sig   • acetaminophen (TYLENOL) 650 mg CR tablet Take 1 tablet (650 mg total) by mouth every 8 (eight) hours as needed for mild pain   • albuterol (PROVENTIL HFA,VENTOLIN HFA) 90 mcg/act inhaler INHALE 2 PUFFS BY MOUTH EVERY 6 HOURS AS NEEDED FOR WHEEZE   • azelastine (OPTIVAR) 0.05 % ophthalmic solution INSTILL 1 DROP INTO BOTH EYES TWICE A DAY   • Cholecalciferol (Vitamin D-3) 125 MCG (5000 UT) TABS Take 1 tablet by mouth in the morning   • cyanocobalamin (VITAMIN B-12) 100 mcg tablet Take by mouth daily   • cyclobenzaprine (FLEXERIL) 10 mg tablet Take 1 tablet (10 mg total) by mouth daily at bedtime   • ferrous sulfate 325 (65 FE) MG EC tablet  "TAKE 1 TABLET BY MOUTH EVERY DAY WITH BREAKFAST   • fluticasone (FLONASE) 50 mcg/act nasal spray 2 sprays into each nostril daily   • folic acid (FOLVITE) 1 mg tablet TAKE 1 TABLET BY MOUTH EVERY DAY   • miSOPROStol (Cytotec) 200 mcg tablet Take 1 tablet (200 mcg total) by mouth 1 (one) time for 1 dose Crush tablet and place in vagina the night before procedure   • Multiple Vitamin (multivitamin) capsule Take 1 capsule by mouth daily   • triamcinolone (KENALOG) 0.1 % cream Apply topically 2 (two) times a day   • [DISCONTINUED] omeprazole (PriLOSEC) 20 mg delayed release capsule Take 1 capsule (20 mg total) by mouth daily     Allergies   Allergen Reactions   • Shellfish Allergy - Food Allergy Dermatitis, Itching, Palpitations, Rash, Shortness Of Breath, Swelling, Throat Swelling and Vomiting   • Contrast [Iodinated Contrast Media] Hives     Immunization History   Administered Date(s) Administered   • COVID-19 PFIZER VACCINE 0.3 ML IM 05/05/2021, 05/26/2021   • INFLUENZA 10/23/2013, 10/10/2017, 09/13/2018, 11/13/2019, 10/27/2020, 10/20/2021, 01/03/2023   • Influenza, injectable, quadrivalent, preservative free 0.5 mL 10/03/2023   • Influenza, recombinant, quadrivalent,injectable, preservative free 01/03/2023   • Tdap 09/13/2018       Objective     /70   Pulse 71   Resp 16   Ht 5' 5\" (1.651 m)   Wt 106 kg (234 lb)   LMP 02/29/2024 (Exact Date)   SpO2 98%   BMI 38.94 kg/m²     Physical Exam  Vitals and nursing note reviewed.   Constitutional:       Appearance: Normal appearance. She is well-developed. She is not ill-appearing.   Eyes:      Extraocular Movements: Extraocular movements intact.   Neck:      Thyroid: No thyromegaly.   Cardiovascular:      Rate and Rhythm: Normal rate and regular rhythm.      Heart sounds: Normal heart sounds. No murmur heard.  Pulmonary:      Effort: Pulmonary effort is normal.      Breath sounds: Normal breath sounds. No wheezing or rales.   Abdominal:      General: Abdomen is " flat. There is no distension.      Palpations: There is no mass.      Tenderness: There is no abdominal tenderness.   Musculoskeletal:      Cervical back: Normal range of motion and neck supple.      Right lower leg: No edema.      Left lower leg: No edema.   Skin:     Findings: No erythema or rash.   Neurological:      General: No focal deficit present.      Mental Status: She is alert.   Psychiatric:         Mood and Affect: Mood normal.       Kelly Bernabe MD

## 2024-04-04 NOTE — ASSESSMENT & PLAN NOTE
Really not taking any medication advised to start the omeprazole and see a gastroenterologist as she complains of a lot of gas abdominal discomfort intermittently her abdominal exam is benign today, no tenderness and no mass

## 2024-04-04 NOTE — ASSESSMENT & PLAN NOTE
B12 level is high advised to stop taking B12 for 1 week and then start half the dose which she is taking, she is not sure what dose she is taking advised to call with the dose

## 2024-05-02 DIAGNOSIS — K21.9 GASTROESOPHAGEAL REFLUX DISEASE WITHOUT ESOPHAGITIS: ICD-10-CM

## 2024-05-03 RX ORDER — OMEPRAZOLE 20 MG/1
CAPSULE, DELAYED RELEASE ORAL
Qty: 90 CAPSULE | Refills: 1 | Status: SHIPPED | OUTPATIENT
Start: 2024-05-03

## 2024-05-13 ENCOUNTER — HOSPITAL ENCOUNTER (OUTPATIENT)
Facility: HOSPITAL | Age: 44
Discharge: HOME | End: 2024-05-13
Payer: COMMERCIAL

## 2024-05-13 ENCOUNTER — HOSPITAL ENCOUNTER (OUTPATIENT)
Dept: ULTRASOUND IMAGING | Facility: HOSPITAL | Age: 44
Discharge: HOME | End: 2024-05-13
Attending: STUDENT IN AN ORGANIZED HEALTH CARE EDUCATION/TRAINING PROGRAM
Payer: COMMERCIAL

## 2024-05-13 DIAGNOSIS — N93.9 ABNORMAL UTERINE BLEEDING (AUB): ICD-10-CM

## 2024-05-13 DIAGNOSIS — D25.9 UTERINE LEIOMYOMA, UNSPECIFIED LOCATION: ICD-10-CM

## 2024-05-13 DIAGNOSIS — Z12.31 ENCOUNTER FOR SCREENING MAMMOGRAM FOR BREAST CANCER: ICD-10-CM

## 2024-05-13 PROCEDURE — 76856 US EXAM PELVIC COMPLETE: CPT

## 2024-05-13 PROCEDURE — 77067 SCR MAMMO BI INCL CAD: CPT

## 2024-05-13 PROCEDURE — 76830 TRANSVAGINAL US NON-OB: CPT

## 2024-05-13 PROCEDURE — 77063 BREAST TOMOSYNTHESIS BI: CPT

## 2024-05-15 ENCOUNTER — OFFICE VISIT (OUTPATIENT)
Dept: FAMILY MEDICINE CLINIC | Facility: CLINIC | Age: 44
End: 2024-05-15
Payer: COMMERCIAL

## 2024-05-15 ENCOUNTER — PATIENT MESSAGE (OUTPATIENT)
Dept: FAMILY MEDICINE CLINIC | Facility: CLINIC | Age: 44
End: 2024-05-15

## 2024-05-15 ENCOUNTER — TELEPHONE (OUTPATIENT)
Dept: FAMILY MEDICINE CLINIC | Facility: CLINIC | Age: 44
End: 2024-05-15

## 2024-05-15 VITALS
HEART RATE: 80 BPM | OXYGEN SATURATION: 99 % | DIASTOLIC BLOOD PRESSURE: 78 MMHG | WEIGHT: 233 LBS | TEMPERATURE: 98 F | BODY MASS INDEX: 38.82 KG/M2 | HEIGHT: 65 IN | SYSTOLIC BLOOD PRESSURE: 122 MMHG

## 2024-05-15 DIAGNOSIS — H66.90 ACUTE OTITIS MEDIA, UNSPECIFIED OTITIS MEDIA TYPE: Primary | ICD-10-CM

## 2024-05-15 DIAGNOSIS — J30.1 SEASONAL ALLERGIC RHINITIS DUE TO POLLEN: ICD-10-CM

## 2024-05-15 PROCEDURE — 99213 OFFICE O/P EST LOW 20 MIN: CPT | Performed by: FAMILY MEDICINE

## 2024-05-15 RX ORDER — AMOXICILLIN 875 MG/1
875 TABLET, COATED ORAL 2 TIMES DAILY
Qty: 20 TABLET | Refills: 0 | Status: SHIPPED | OUTPATIENT
Start: 2024-05-15 | End: 2024-05-25

## 2024-05-15 RX ORDER — FLUTICASONE PROPIONATE 50 MCG
2 SPRAY, SUSPENSION (ML) NASAL DAILY
Qty: 18 ML | Refills: 0 | Status: SHIPPED | OUTPATIENT
Start: 2024-05-15

## 2024-05-15 NOTE — PROGRESS NOTES
Subjective:      Patient ID: Narda Cook is a 44 y.o. female.    Sore Throat   This is a new problem. The current episode started yesterday. The problem has been rapidly worsening. The pain is worse on the right side. There has been no fever. The pain is at a severity of 9/10. Associated symptoms include coughing, ear pain, headaches, neck pain, swollen glands and trouble swallowing. Pertinent negatives include no abdominal pain, congestion, diarrhea, drooling, ear discharge, hoarse voice, plugged ear sensation, shortness of breath, stridor or vomiting. She has had no exposure to strep or mono.       Past Medical History:   Diagnosis Date    Anemia     Asthma     Genital warts 2003    Went i was pregnan    Migraine 1999       Family History   Problem Relation Age of Onset    Diabetes Mother     Thyroid disease Mother     No Known Problems Sister     No Known Problems Daughter     Breast cancer Maternal Grandmother     Colon cancer Maternal Grandmother     No Known Problems Maternal Grandfather     No Known Problems Paternal Grandmother     No Known Problems Paternal Grandfather     No Known Problems Maternal Aunt     No Known Problems Maternal Aunt     No Known Problems Maternal Aunt     No Known Problems Maternal Aunt     No Known Problems Maternal Aunt     No Known Problems Maternal Aunt     No Known Problems Maternal Aunt        Past Surgical History:   Procedure Laterality Date    ANKLE ARTHROSCOPY      BARIATRIC SURGERY  2009    [place] by [place]    GASTRIC BYPASS LAPAROSCOPIC          reports that she has never smoked. She has never used smokeless tobacco. She reports current alcohol use. She reports that she does not use drugs.      Current Outpatient Medications:     acetaminophen (TYLENOL) 650 mg CR tablet, Take 1 tablet (650 mg total) by mouth every 8 (eight) hours as needed for mild pain, Disp: 30 tablet, Rfl: 0    albuterol (PROVENTIL HFA,VENTOLIN HFA) 90 mcg/act inhaler, INHALE 2 PUFFS BY MOUTH EVERY 6  HOURS AS NEEDED FOR WHEEZE, Disp: 6.7 g, Rfl: 0    amoxicillin (AMOXIL) 875 mg tablet, Take 1 tablet (875 mg total) by mouth 2 (two) times a day for 10 days, Disp: 20 tablet, Rfl: 0    azelastine (OPTIVAR) 0.05 % ophthalmic solution, INSTILL 1 DROP INTO BOTH EYES TWICE A DAY, Disp: 18 mL, Rfl: 1    Cholecalciferol (Vitamin D-3) 125 MCG (5000 UT) TABS, Take 1 tablet by mouth in the morning, Disp: 90 tablet, Rfl: 0    cyanocobalamin (VITAMIN B-12) 100 mcg tablet, Take by mouth daily, Disp: , Rfl:     cyclobenzaprine (FLEXERIL) 10 mg tablet, Take 1 tablet (10 mg total) by mouth daily at bedtime, Disp: 30 tablet, Rfl: 0    ferrous sulfate 325 (65 FE) MG EC tablet, TAKE 1 TABLET BY MOUTH EVERY DAY WITH BREAKFAST, Disp: 90 tablet, Rfl: 1    fluticasone (FLONASE) 50 mcg/act nasal spray, 2 sprays into each nostril daily, Disp: 18 mL, Rfl: 0    folic acid (FOLVITE) 1 mg tablet, TAKE 1 TABLET BY MOUTH EVERY DAY, Disp: 90 tablet, Rfl: 1    Multiple Vitamin (multivitamin) capsule, Take 1 capsule by mouth daily, Disp: , Rfl:     omeprazole (PriLOSEC) 20 mg delayed release capsule, TAKE 1 CAPSULE BY MOUTH EVERY DAY (INSURANCE WANTS MAIL ORDER AFTER 2 FILLS), Disp: 90 capsule, Rfl: 1    triamcinolone (KENALOG) 0.1 % cream, Apply topically 2 (two) times a day, Disp: 30 g, Rfl: 0    miSOPROStol (Cytotec) 200 mcg tablet, Take 1 tablet (200 mcg total) by mouth 1 (one) time for 1 dose Crush tablet and place in vagina the night before procedure, Disp: 1 tablet, Rfl: 0    The following portions of the patient's history were reviewed and updated as appropriate: allergies, current medications, past family history, past medical history, past social history, past surgical history and problem list.    Review of Systems   HENT:  Positive for ear pain, sore throat and trouble swallowing. Negative for congestion, drooling, ear discharge and hoarse voice.    Respiratory:  Positive for cough. Negative for shortness of breath and stridor.   "  Gastrointestinal:  Negative for abdominal pain, diarrhea and vomiting.   Musculoskeletal:  Positive for neck pain.   Neurological:  Positive for headaches.           Objective:    /78   Pulse 80   Temp 98 °F (36.7 °C)   Ht 5' 5\" (1.651 m)   Wt 106 kg (233 lb)   LMP 04/26/2024   SpO2 99%   BMI 38.77 kg/m²      Physical Exam  HENT:      Ears:      Comments: B /L TM congestion.      Mouth/Throat:      Pharynx: Posterior oropharyngeal erythema present.   Cardiovascular:      Heart sounds: Normal heart sounds.   Pulmonary:      Breath sounds: Normal breath sounds.           Recent Results (from the past 1008 hour(s))   CBC and differential    Collection Time: 04/04/24  8:39 AM   Result Value Ref Range    WBC 6.52 4.31 - 10.16 Thousand/uL    RBC 3.99 3.81 - 5.12 Million/uL    Hemoglobin 11.7 11.5 - 15.4 g/dL    Hematocrit 37.8 34.8 - 46.1 %    MCV 95 82 - 98 fL    MCH 29.3 26.8 - 34.3 pg    MCHC 31.0 (L) 31.4 - 37.4 g/dL    RDW 13.8 11.6 - 15.1 %    MPV 10.9 8.9 - 12.7 fL    Platelets 229 149 - 390 Thousands/uL    nRBC 0 /100 WBCs    Segmented % 71 43 - 75 %    Immature Grans % 0 0 - 2 %    Lymphocytes % 19 14 - 44 %    Monocytes % 8 4 - 12 %    Eosinophils Relative 1 0 - 6 %    Basophils Relative 1 0 - 1 %    Absolute Neutrophils 4.63 1.85 - 7.62 Thousands/µL    Absolute Immature Grans 0.02 0.00 - 0.20 Thousand/uL    Absolute Lymphocytes 1.25 0.60 - 4.47 Thousands/µL    Absolute Monocytes 0.49 0.17 - 1.22 Thousand/µL    Eosinophils Absolute 0.09 0.00 - 0.61 Thousand/µL    Basophils Absolute 0.04 0.00 - 0.10 Thousands/µL   Comprehensive metabolic panel    Collection Time: 04/04/24  8:39 AM   Result Value Ref Range    Sodium 138 135 - 147 mmol/L    Potassium 4.1 3.5 - 5.3 mmol/L    Chloride 104 96 - 108 mmol/L    CO2 26 21 - 32 mmol/L    ANION GAP 8 4 - 13 mmol/L    BUN 12 5 - 25 mg/dL    Creatinine 0.61 0.60 - 1.30 mg/dL    Glucose, Fasting 84 65 - 99 mg/dL    Calcium 8.6 8.4 - 10.2 mg/dL    AST 30 13 - 39 " U/L    ALT 19 7 - 52 U/L    Alkaline Phosphatase 54 34 - 104 U/L    Total Protein 6.6 6.4 - 8.4 g/dL    Albumin 3.7 3.5 - 5.0 g/dL    Total Bilirubin 0.53 0.20 - 1.00 mg/dL    eGFR 110 ml/min/1.73sq m   Lipid panel    Collection Time: 04/04/24  8:39 AM   Result Value Ref Range    Cholesterol 171 See Comment mg/dL    Triglycerides 59 See Comment mg/dL    HDL, Direct 91 >=50 mg/dL    LDL Calculated 68 0 - 100 mg/dL    Non-HDL-Chol (CHOL-HDL) 80 mg/dl   TSH, 3rd generation    Collection Time: 04/04/24  8:39 AM   Result Value Ref Range    TSH 3RD GENERATON 1.454 0.450 - 4.500 uIU/mL   Vitamin D 25 hydroxy    Collection Time: 04/04/24  8:39 AM   Result Value Ref Range    Vit D, 25-Hydroxy 50.5 30.0 - 100.0 ng/mL   Vitamin B12    Collection Time: 04/04/24  8:39 AM   Result Value Ref Range    Vitamin B-12 1,445 (H) 180 - 914 pg/mL       Assessment/Plan:    No problem-specific Assessment & Plan notes found for this encounter.           Problem List Items Addressed This Visit          Respiratory    Seasonal allergic rhinitis due to pollen    Relevant Medications    fluticasone (FLONASE) 50 mcg/act nasal spray     Other Visit Diagnoses       Acute otitis media, unspecified otitis media type    -  Primary    Relevant Medications    amoxicillin (AMOXIL) 875 mg tablet

## 2024-05-15 NOTE — TELEPHONE ENCOUNTER
----- Message from Kelly Bernabe MD sent at 5/15/2024  8:49 AM EDT -----  Normal mammogram, please inform the patient

## 2024-06-06 DIAGNOSIS — J30.1 SEASONAL ALLERGIC RHINITIS DUE TO POLLEN: ICD-10-CM

## 2024-06-07 ENCOUNTER — CONSULT (OUTPATIENT)
Dept: GASTROENTEROLOGY | Facility: CLINIC | Age: 44
End: 2024-06-07
Payer: COMMERCIAL

## 2024-06-07 VITALS
DIASTOLIC BLOOD PRESSURE: 67 MMHG | SYSTOLIC BLOOD PRESSURE: 109 MMHG | HEART RATE: 55 BPM | HEIGHT: 64 IN | BODY MASS INDEX: 38.89 KG/M2 | WEIGHT: 227.8 LBS

## 2024-06-07 DIAGNOSIS — Z80.0 FAMILY HISTORY OF COLON CANCER: ICD-10-CM

## 2024-06-07 DIAGNOSIS — R14.0 BLOATING: ICD-10-CM

## 2024-06-07 DIAGNOSIS — K21.9 GASTROESOPHAGEAL REFLUX DISEASE WITHOUT ESOPHAGITIS: Primary | ICD-10-CM

## 2024-06-07 DIAGNOSIS — R19.7 DIARRHEA, UNSPECIFIED TYPE: ICD-10-CM

## 2024-06-07 DIAGNOSIS — R19.4 CHANGE IN BOWEL HABIT: ICD-10-CM

## 2024-06-07 DIAGNOSIS — Z98.84 HISTORY OF ROUX-EN-Y GASTRIC BYPASS: ICD-10-CM

## 2024-06-07 DIAGNOSIS — Z98.84 H/O GASTRIC BYPASS: ICD-10-CM

## 2024-06-07 DIAGNOSIS — R10.13 DYSPEPSIA: ICD-10-CM

## 2024-06-07 PROCEDURE — 99204 OFFICE O/P NEW MOD 45 MIN: CPT | Performed by: INTERNAL MEDICINE

## 2024-06-07 RX ORDER — FLUTICASONE PROPIONATE 50 MCG
SPRAY, SUSPENSION (ML) NASAL
Qty: 48 ML | Refills: 1 | Status: SHIPPED | OUTPATIENT
Start: 2024-06-07

## 2024-06-07 RX ORDER — POLYETHYLENE GLYCOL 3350, SODIUM CHLORIDE, SODIUM BICARBONATE, POTASSIUM CHLORIDE 420; 11.2; 5.72; 1.48 G/4L; G/4L; G/4L; G/4L
4000 POWDER, FOR SOLUTION ORAL ONCE
Qty: 4000 ML | Refills: 0 | Status: SHIPPED | OUTPATIENT
Start: 2024-06-07 | End: 2024-06-07

## 2024-06-07 NOTE — PATIENT INSTRUCTIONS
Scheduled date of EGD/colonoscopy (as of today):8/7/2024  Physician performing EGD/colonoscopy:Dr. Gold  Location of EGD/colonoscopy:[facility]  Desired bowel prep reviewed with patient:carmen  Instructions reviewed with patient by:re  Clearances:  na    Patient takes ferrous Sulfate, hold 5 days prior

## 2024-06-07 NOTE — PROGRESS NOTES
[facility] [place] - Outpatient Consultation  Narda Cook 44 y.o. female MRN: [id number]  Encounter: [id number]          ASSESSMENT AND PLAN:      1. Gastroesophageal reflux disease without esophagitis  -     Ambulatory Referral to Gastroenterology  -     EGD; Future; Expected date: 06/07/2024  2. Dyspepsia  -     EGD; Future; Expected date: 06/07/2024  3. Bloating  4. History of Deana-en-Y gastric bypass  5. H/O gastric bypass  6. Diarrhea, unspecified type  7. Family history of colon cancer  -     Colonoscopy; Future; Expected date: 06/07/2024  -     polyethylene glycol-electrolytes (NULYTELY) 4000 mL solution; Take 4,000 mL by mouth once for 1 dose  8. Change in bowel habit  -     Colonoscopy; Future; Expected date: 06/07/2024    History of heartburn acid reflux indigestion dyspepsia bloating gastric bypass surgery, clinically no evidence of acute abdomen is obstruction, may have GERD, gastritis and anastomotic ulcer, antireflux measures reviewed and reinforced eating smaller portions avoid late-night meals, schedule EGD for the same.  She may have had this done many years ago those records not available.      History of gastric bypass real surgery also has change in bowel habit episode of diarrhea cramping urgency may have IBS but any other colonic pathology of concern, schedule colonoscopy, procedure and prep discussed at length.    Status post gastric bypass surgery.   ______________________________________________________________________    HPI:      Patient came in for evaluation of her multiple GI symptoms, she has fullness bloating indigestion, sometimes diarrhea change in bowel habits.  She has had gastric bypass surgery and lost weight from 400 to 220 pounds.  She denies any vomiting bleeding melena hematochezia chest pain shortness of breath fever chills rash.  Works in a school cafeteria.  Denies any dysphagia coughing choking spells, has some heartburn indigestion burning in  the throat and chest.  Tries to eat smaller portions.  Does not drink too much soda coffee fried foods large meals.  Diet medications more than 10 systems reviewed.      REVIEW OF SYSTEMS:    CONSTITUTIONAL: Denies any fever, chills, rigors, and weight loss.  HEENT: No earache or tinnitus.  CARDIOVASCULAR: No chest pain or palpitations.   RESPIRATORY: Denies any cough, hemoptysis, shortness of breath or dyspnea on exertion.  GASTROINTESTINAL: As noted in the History of Present Illness.   GENITOURINARY: Denies any hematuria or dysuria.  NEUROLOGIC: No dizziness or vertigo.   MUSCULOSKELETAL: Denies any joint swellings.  SKIN: Denies skin rashes or itching.   ENDOCRINE: Denies excessive thirst. Denies intolerance to heat or cold.  PSYCHOSOCIAL: Denies depression or anxiety. Denies any recent memory loss.       Historical Information   Past Medical History:   Diagnosis Date   • Anemia    • Asthma    • Genital warts 2003    Went i was pregnan   • Migraine 1999     Past Surgical History:   Procedure Laterality Date   • ANKLE ARTHROSCOPY     • BARIATRIC SURGERY  2009    [place]   • GASTRIC BYPASS LAPAROSCOPIC       Social History   Social History     Substance and Sexual Activity   Alcohol Use Yes    Comment: Social     Social History     Substance and Sexual Activity   Drug Use Never     Social History     Tobacco Use   Smoking Status Never   Smokeless Tobacco Never     Family History   Problem Relation Age of Onset   • Diabetes Mother    • Thyroid disease Mother    • No Known Problems Sister    • No Known Problems Daughter    • Breast cancer Maternal Grandmother    • Colon cancer Maternal Grandmother    • No Known Problems Maternal Grandfather    • No Known Problems Paternal Grandmother    • No Known Problems Paternal Grandfather    • No Known Problems Maternal Aunt    • No Known Problems Maternal Aunt    • No Known Problems Maternal Aunt    • No Known Problems Maternal Aunt    • No Known Problems Maternal Aunt   "  • No Known Problems Maternal Aunt    • No Known Problems Maternal Aunt        Meds/Allergies       Current Outpatient Medications:   •  acetaminophen (TYLENOL) 650 mg CR tablet  •  albuterol (PROVENTIL HFA,VENTOLIN HFA) 90 mcg/act inhaler  •  azelastine (OPTIVAR) 0.05 % ophthalmic solution  •  Cholecalciferol (Vitamin D-3) 125 MCG (5000 UT) TABS  •  cyanocobalamin (VITAMIN B-12) 100 mcg tablet  •  cyclobenzaprine (FLEXERIL) 10 mg tablet  •  ferrous sulfate 325 (65 FE) MG EC tablet  •  fluticasone (FLONASE) 50 mcg/act nasal spray  •  folic acid (FOLVITE) 1 mg tablet  •  Multiple Vitamin (multivitamin) capsule  •  omeprazole (PriLOSEC) 20 mg delayed release capsule  •  polyethylene glycol-electrolytes (NULYTELY) 4000 mL solution  •  triamcinolone (KENALOG) 0.1 % cream    Allergies   Allergen Reactions   • Shellfish Allergy - Food Allergy Dermatitis, Itching, Palpitations, Rash, Shortness Of Breath, Swelling, Throat Swelling and Vomiting   • Contrast [Iodinated Contrast Media] Hives           Objective     Blood pressure 109/67, pulse 55, height 5' 4\" (1.626 m), weight 103 kg (227 lb 12.8 oz), last menstrual period 05/22/2024. Body mass index is 39.1 kg/m².        PHYSICAL EXAM:      General Appearance:   Alert, cooperative, no distress   HEENT:   Normocephalic, atraumatic, anicteric.     Neck:  Supple, symmetrical, trachea midline   Lungs:   Clear to auscultation bilaterally; no rales, rhonchi or wheezing; respirations unlabored    Heart::   Regular rate and rhythm; no murmur.   Abdomen:   Soft, non-tender, non-distended; normal bowel sounds; no masses, no organomegaly    Genitalia:   Deferred    Rectal:   Deferred    Extremities:  No cyanosis, clubbing or edema    Skin:  No jaundice, rashes, or lesions    Lymph nodes:  No palpable cervical lymphadenopathy        Lab Results:   No visits with results within 1 Day(s) from this visit.   Latest known visit with results is:   Appointment on 04/04/2024   Component Date " Value   • WBC 04/04/2024 6.52    • RBC 04/04/2024 3.99    • Hemoglobin 04/04/2024 11.7    • Hematocrit 04/04/2024 37.8    • MCV 04/04/2024 95    • MCH 04/04/2024 29.3    • MCHC 04/04/2024 31.0 (L)    • RDW 04/04/2024 13.8    • MPV 04/04/2024 10.9    • Platelets 04/04/2024 229    • nRBC 04/04/2024 0    • Segmented % 04/04/2024 71    • Immature Grans % 04/04/2024 0    • Lymphocytes % 04/04/2024 19    • Monocytes % 04/04/2024 8    • Eosinophils Relative 04/04/2024 1    • Basophils Relative 04/04/2024 1    • Absolute Neutrophils 04/04/2024 4.63    • Absolute Immature Grans 04/04/2024 0.02    • Absolute Lymphocytes 04/04/2024 1.25    • Absolute Monocytes 04/04/2024 0.49    • Eosinophils Absolute 04/04/2024 0.09    • Basophils Absolute 04/04/2024 0.04    • Sodium 04/04/2024 138    • Potassium 04/04/2024 4.1    • Chloride 04/04/2024 104    • CO2 04/04/2024 26    • ANION GAP 04/04/2024 8    • BUN 04/04/2024 12    • Creatinine 04/04/2024 0.61    • Glucose, Fasting 04/04/2024 84    • Calcium 04/04/2024 8.6    • AST 04/04/2024 30    • ALT 04/04/2024 19    • Alkaline Phosphatase 04/04/2024 54    • Total Protein 04/04/2024 6.6    • Albumin 04/04/2024 3.7    • Total Bilirubin 04/04/2024 0.53    • eGFR 04/04/2024 110    • Cholesterol 04/04/2024 171    • Triglycerides 04/04/2024 59    • HDL, Direct 04/04/2024 91    • LDL Calculated 04/04/2024 68    • Non-HDL-Chol (CHOL-HDL) 04/04/2024 80    • TSH 3RD GENERATON 04/04/2024 1.454    • Vit D, 25-Hydroxy 04/04/2024 50.5    • Vitamin B-12 04/04/2024 1,445 (H)          Radiology Results:   US pelvis complete w transvaginal    Result Date: 5/23/2024  Narrative: PELVIC ULTRASOUND, COMPLETE INDICATION: The patient is 44 years old. N93.9: Abnormal uterine and vaginal bleeding, unspecified D25.9: Leiomyoma of uterus, unspecified. Uterine leiomyoma. Abnormal uterine bleeding. COMPARISON: Pelvic ultrasound 4/12/2023. TECHNIQUE: Transabdominal pelvic ultrasound was performed in sagittal and  transverse planes with a curvilinear transducer. Additional transvaginal imaging was performed to better evaluate the endometrium and ovaries. Imaging included volumetric sweeps as  well as traditional still imaging technique. FINDINGS: UTERUS: The uterus is anteverted in position, measuring 10.3 x 6.2 x 6.6 cm. Rounded well-defined nodule(s) present, likely representing leiomyoma(s), as follows: Fibroid 1: 4.7 x 5.4 x 5.2 cm. Subserosal/intramural. Anterior fundal location. No significant change compared to prior Fibroid 2: 3.8 x 3.1 x 3.4 cm. Intramural location. Right lower uterine segment/cervix location. Slightly increased in size compared to prior when it measured 3.3 x 2.6 x 2.7 cm. Due to distortion by fibroids and shadowing artifacts, visualization is limited and it is difficult to exclude a submucosal component. Nabothian cyst(s) present, cervix otherwise within normal limits. ENDOMETRIUM: The endometrial echo complex has an AP caliber of 8.0 mm. Limited visualization due to distortion by fibroids. Visualized portion appears grossly within normal limits. OVARIES/ADNEXA: Right ovary: 2.9 x 2.3 x 2.0 cm. 7.1 mL. Ovarian Doppler flow is within normal limits. No suspicious ovarian or adnexal abnormality. Left ovary: Not visualized OTHER: Small amount of simple free fluid in the pelvis, likely physiologic in this premenopausal female.     Impression: 1. Two fibroids in the uterus. The anterior fundal fibroid is stable. Right lower uterine segment fibroid is slightly enlarged compared to prior exam 4/12/2023. Due to distortion by fibroids and shadowing artifacts, visualization is limited and it is difficult to exclude a submucosal component. 2. Limited visualization of the endometrium due to distortion by fibroids. Visualized portion appears grossly within normal limits. Resident: JESE SANTAMARIA I, the attending radiologist, have reviewed the images and agree with the final report above. Workstation performed:  [id number]     Mammo screening bilateral w 3d & cad    Result Date: 5/15/2024  Narrative: DIAGNOSIS: Encounter for screening mammogram for breast cancer TECHNIQUE: Digital screening mammography was performed. Computer Aided Detection (CAD) analyzed all applicable images. COMPARISONS: Prior breast imaging dated: 02/09/2023, 12/13/2021, 12/13/2021, 07/06/2020, and 07/06/2020 RELEVANT HISTORY: Family Breast Cancer History: History of breast cancer in Maternal Grandmother. Family Medical History: Family medical history includes breast cancer in maternal grandmother and colon cancer in maternal grandmother. Personal History: No known relevant hormone history. No known relevant surgical history. No known relevant medical history. The patient is scheduled in a reminder system for screening mammography. 8-10% of cancers will be missed on mammography. Management of a palpable abnormality must be based on clinical grounds.  Patients will be notified of their results via letter from our facility. Accredited by [employer] of [employer] and FDA. RISK ASSESSMENT: 5 Year Tyrer-Cuzick: 0.68% 10 Year Tyrer-Cuzick: 1.6% Lifetime Tyrer-Cuzick: 9.39% TISSUE DENSITY: There are scattered areas of fibroglandular density. INDICATION: Narda Cook is a 44 y.o. female presenting for screening mammography. FINDINGS: There are no suspicious masses, grouped microcalcifications or areas of architectural distortion. The skin and nipple areolar complex are unremarkable.     Impression: No mammographic evidence of malignancy. ASSESSMENT/BI-RADS CATEGORY: Left: 1 - Negative Right: 1 - Negative Overall: 1 - Negative RECOMMENDATION:      - Routine screening mammogram in 1 year for both breasts. Workstation ID: [id number]   Answers submitted by the patient for this visit:  Abdominal Pain Questionnaire (Submitted on 6/7/2024)  Chief Complaint: Abdominal pain  Chronicity: recurrent  Onset: more than 1 month ago  Onset quality:  sudden  Frequency: intermittently  Episode duration: 4 Hours  Progression since onset: waxing and waning  Pain location: RLQ, RUQ, epigastric region  Pain - numeric: 9/10  Pain quality: burning, cramping, tearing  Radiates to: LLQ, RLQ, RUQ, epigastric region, chest, pelvis  anorexia: Yes  arthralgias: Yes  belching: Yes  constipation: No  diarrhea: Yes  dysuria: No  fever: No  flatus: Yes  frequency: Yes  headaches: Yes  hematochezia: No  hematuria: No  melena: Yes  myalgias: Yes  nausea: Yes  weight loss: No  vomiting: Yes  Aggravated by: eating  Relieved by: bowel movements, certain positions, passing flatus, vomiting  Diagnostic workup: ultrasound

## 2024-06-12 DIAGNOSIS — L30.9 DERMATITIS: ICD-10-CM

## 2024-06-12 RX ORDER — TRIAMCINOLONE ACETONIDE 1 MG/G
1 CREAM TOPICAL 2 TIMES DAILY
Qty: 30 G | Refills: 0 | Status: SHIPPED | OUTPATIENT
Start: 2024-06-12

## 2024-06-20 ENCOUNTER — OFFICE VISIT (OUTPATIENT)
Dept: OBGYN CLINIC | Facility: CLINIC | Age: 44
End: 2024-06-20
Payer: COMMERCIAL

## 2024-06-20 VITALS
WEIGHT: 228 LBS | BODY MASS INDEX: 38.93 KG/M2 | DIASTOLIC BLOOD PRESSURE: 80 MMHG | SYSTOLIC BLOOD PRESSURE: 120 MMHG | HEIGHT: 64 IN

## 2024-06-20 DIAGNOSIS — N93.9 ABNORMAL UTERINE BLEEDING (AUB): Primary | ICD-10-CM

## 2024-06-20 DIAGNOSIS — B37.9 YEAST INFECTION: ICD-10-CM

## 2024-06-20 PROCEDURE — 99214 OFFICE O/P EST MOD 30 MIN: CPT | Performed by: STUDENT IN AN ORGANIZED HEALTH CARE EDUCATION/TRAINING PROGRAM

## 2024-06-20 RX ORDER — DROSPIRENONE 4 MG/1
1 TABLET, FILM COATED ORAL DAILY
Qty: 28 TABLET | Refills: 0 | Status: SHIPPED | OUTPATIENT
Start: 2024-06-20

## 2024-06-20 RX ORDER — FLUCONAZOLE 150 MG/1
TABLET ORAL
Qty: 2 TABLET | Refills: 0 | Status: SHIPPED | OUTPATIENT
Start: 2024-06-20 | End: 2024-06-23

## 2024-06-20 NOTE — PROGRESS NOTES
"Ambulatory Visit  Name: Narda Cook      : 1980      MRN: [id number]  Encounter Provider: Mica Espinal MD  Encounter Date: 2024   Encounter department: [facility] [place]    Assessment & Plan   1. Abnormal uterine bleeding (AUB)  -     Drospirenone (Slynd) 4 MG TABS; Take 1 tablet by mouth in the morning  2. Yeast infection  -     fluconazole (DIFLUCAN) 150 mg tablet; Take one tablet today, if continue to have symptoms on the third day, take the second tablet.      Reviewed ultrasound--minimal change in fibroids  Tried armando with poor side effect profile, does not want to try again  Not a good candidate for Iud given fibroids  Discussed depo, nexplanon, slynd, prefers trial of slynd  If insufficient, ready to discuss hysterectomy  RTO in 1-2 months for follow-up, possible preop hyst  Rx diflucan for yeast infection    History of Present Illness     Narda Cook is a 44 y.o. female who presents for follow-up AUB and also notes vaginal discharge    Last month two periods  Endometrial biopsy 2024: benign  Tried armando with bad side effects--shakes, dizziness  doesnt want DepoProvera    Hx of 1xSVD and gastric sleeve  Things may be ready to consider hysterectomy given ongoing irregular bleeding  Willing to try one more medication    Rx'd antibiotics recently and now having vaginal irritation, itching, discharge  No pelvic pain    Review of Systems --per HPI    Objective     /80 (BP Location: Right arm, Patient Position: Sitting)   Ht 5' 4\" (1.626 m)   Wt 103 kg (228 lb)   LMP 2024 (Approximate)   BMI 39.14 kg/m²     Physical Exam  Constitutional:       Appearance: Normal appearance.   HENT:      Head: Normocephalic and atraumatic.      Nose: Nose normal.   Eyes:      Extraocular Movements: Extraocular movements intact.      Conjunctiva/sclera: Conjunctivae normal.   Pulmonary:      Effort: Pulmonary effort is normal.   Genitourinary:     " Comments: Vulva: normal, no lesions  Vagina: clumpy white discharge, no lesions or ttp  Urethra: normal, no lesions, masses or ttp  Bladder: normal, no masses or ttp  Cervix: normal, no lesions, masses or CMT    Musculoskeletal:         General: Normal range of motion.      Cervical back: Normal range of motion.   Skin:     General: Skin is warm and dry.   Neurological:      General: No focal deficit present.      Mental Status: She is alert.   Psychiatric:         Mood and Affect: Mood normal.         Behavior: Behavior normal.         Thought Content: Thought content normal.       Administrative Statements

## 2024-07-24 ENCOUNTER — ANESTHESIA (OUTPATIENT)
Dept: ANESTHESIOLOGY | Facility: HOSPITAL | Age: 44
End: 2024-07-24

## 2024-07-24 ENCOUNTER — ANESTHESIA EVENT (OUTPATIENT)
Dept: ANESTHESIOLOGY | Facility: HOSPITAL | Age: 44
End: 2024-07-24

## 2024-08-02 ENCOUNTER — TELEPHONE (OUTPATIENT)
Dept: GASTROENTEROLOGY | Facility: CLINIC | Age: 44
End: 2024-08-02

## 2024-08-02 NOTE — TELEPHONE ENCOUNTER
lmom confirming pt's colonoscopy/egd scheduled on 8/7/24 at [facility] with Dr Gold.  Informed [facility] would be calling the day prior with the arrival time.  Advised to hold ferrous sulfate 5 days prior to the procedure. Informed of clear liquid diet day prior as well as the bowel cleansing preparation.  Informed would need a  the day of the procedure due to being under sedation. I asked pt to please call back if has not received instructions or if has any questions.

## 2024-08-06 ENCOUNTER — ANESTHESIA (OUTPATIENT)
Dept: ANESTHESIOLOGY | Facility: HOSPITAL | Age: 44
End: 2024-08-06

## 2024-08-06 ENCOUNTER — ANESTHESIA EVENT (OUTPATIENT)
Dept: ANESTHESIOLOGY | Facility: HOSPITAL | Age: 44
End: 2024-08-06

## 2024-08-06 RX ORDER — SODIUM CHLORIDE, SODIUM LACTATE, POTASSIUM CHLORIDE, CALCIUM CHLORIDE 600; 310; 30; 20 MG/100ML; MG/100ML; MG/100ML; MG/100ML
125 INJECTION, SOLUTION INTRAVENOUS CONTINUOUS
Status: CANCELLED | OUTPATIENT
Start: 2024-08-06

## 2024-08-07 ENCOUNTER — HOSPITAL ENCOUNTER (OUTPATIENT)
Dept: GASTROENTEROLOGY | Facility: AMBULARY SURGERY CENTER | Age: 44
Setting detail: OUTPATIENT SURGERY
Discharge: HOME | End: 2024-08-07
Attending: INTERNAL MEDICINE
Payer: COMMERCIAL

## 2024-08-07 ENCOUNTER — ANESTHESIA (OUTPATIENT)
Dept: GASTROENTEROLOGY | Facility: AMBULARY SURGERY CENTER | Age: 44
End: 2024-08-07

## 2024-08-07 ENCOUNTER — ANESTHESIA EVENT (OUTPATIENT)
Dept: GASTROENTEROLOGY | Facility: AMBULARY SURGERY CENTER | Age: 44
End: 2024-08-07

## 2024-08-07 VITALS
RESPIRATION RATE: 18 BRPM | HEART RATE: 50 BPM | OXYGEN SATURATION: 98 % | DIASTOLIC BLOOD PRESSURE: 73 MMHG | SYSTOLIC BLOOD PRESSURE: 131 MMHG | TEMPERATURE: 98 F

## 2024-08-07 DIAGNOSIS — R19.4 CHANGE IN BOWEL HABIT: ICD-10-CM

## 2024-08-07 DIAGNOSIS — R10.13 DYSPEPSIA: ICD-10-CM

## 2024-08-07 DIAGNOSIS — K21.9 GASTROESOPHAGEAL REFLUX DISEASE WITHOUT ESOPHAGITIS: ICD-10-CM

## 2024-08-07 DIAGNOSIS — Z80.0 FAMILY HISTORY OF COLON CANCER: ICD-10-CM

## 2024-08-07 PROCEDURE — 45380 COLONOSCOPY AND BIOPSY: CPT | Performed by: INTERNAL MEDICINE

## 2024-08-07 PROCEDURE — 88305 TISSUE EXAM BY PATHOLOGIST: CPT | Performed by: SPECIALIST

## 2024-08-07 PROCEDURE — 43239 EGD BIOPSY SINGLE/MULTIPLE: CPT | Performed by: INTERNAL MEDICINE

## 2024-08-07 RX ORDER — ONDANSETRON 2 MG/ML
4 INJECTION INTRAMUSCULAR; INTRAVENOUS ONCE AS NEEDED
Status: CANCELLED | OUTPATIENT
Start: 2024-08-07

## 2024-08-07 RX ORDER — SODIUM CHLORIDE, SODIUM LACTATE, POTASSIUM CHLORIDE, CALCIUM CHLORIDE 600; 310; 30; 20 MG/100ML; MG/100ML; MG/100ML; MG/100ML
INJECTION, SOLUTION INTRAVENOUS CONTINUOUS PRN
Status: DISCONTINUED | OUTPATIENT
Start: 2024-08-07 | End: 2024-08-07

## 2024-08-07 RX ORDER — SODIUM CHLORIDE, SODIUM LACTATE, POTASSIUM CHLORIDE, CALCIUM CHLORIDE 600; 310; 30; 20 MG/100ML; MG/100ML; MG/100ML; MG/100ML
125 INJECTION, SOLUTION INTRAVENOUS CONTINUOUS
Status: DISCONTINUED | OUTPATIENT
Start: 2024-08-07 | End: 2024-08-11 | Stop reason: HOSPADM

## 2024-08-07 RX ORDER — PROPOFOL 10 MG/ML
INJECTION, EMULSION INTRAVENOUS AS NEEDED
Status: DISCONTINUED | OUTPATIENT
Start: 2024-08-07 | End: 2024-08-07

## 2024-08-07 RX ORDER — PROPOFOL 10 MG/ML
INJECTION, EMULSION INTRAVENOUS CONTINUOUS PRN
Status: DISCONTINUED | OUTPATIENT
Start: 2024-08-07 | End: 2024-08-07

## 2024-08-07 RX ORDER — LIDOCAINE HYDROCHLORIDE 10 MG/ML
INJECTION, SOLUTION EPIDURAL; INFILTRATION; INTRACAUDAL; PERINEURAL AS NEEDED
Status: DISCONTINUED | OUTPATIENT
Start: 2024-08-07 | End: 2024-08-07

## 2024-08-07 RX ADMIN — LIDOCAINE HYDROCHLORIDE 50 MG: 10 INJECTION, SOLUTION EPIDURAL; INFILTRATION; INTRACAUDAL; PERINEURAL at 13:06

## 2024-08-07 RX ADMIN — PROPOFOL 200 MG: 10 INJECTION, EMULSION INTRAVENOUS at 13:06

## 2024-08-07 RX ADMIN — SODIUM CHLORIDE, SODIUM LACTATE, POTASSIUM CHLORIDE, AND CALCIUM CHLORIDE: .6; .31; .03; .02 INJECTION, SOLUTION INTRAVENOUS at 12:58

## 2024-08-07 RX ADMIN — PROPOFOL 100 MCG/KG/MIN: 10 INJECTION, EMULSION INTRAVENOUS at 13:06

## 2024-08-07 NOTE — ANESTHESIA PREPROCEDURE EVALUATION
Procedure:  COLONOSCOPY  EGD    Relevant Problems   ANESTHESIA (within normal limits)      CARDIO (within normal limits)      GI/HEPATIC   (+) Gastroesophageal reflux disease without esophagitis      HEMATOLOGY   (+) Iron deficiency anemia      PULMONARY   (+) Mild intermittent asthma without complication        Physical Exam    Airway    Mallampati score: I  TM Distance: >3 FB  Neck ROM: full     Dental   No notable dental hx     Cardiovascular  Cardiovascular exam normal    Pulmonary  Pulmonary exam normal     Other Findings  post-pubertal.      Anesthesia Plan  ASA Score- 2     Anesthesia Type- IV sedation with anesthesia with ASA Monitors.         Additional Monitors:     Airway Plan:            Plan Factors-Exercise tolerance (METS): >4 METS.    Chart reviewed.   Existing labs reviewed. Patient summary reviewed.    Patient is not a current smoker.              Induction-     Postoperative Plan-         Informed Consent- Anesthetic plan and risks discussed with patient.  I personally reviewed this patient with the CRNA. Discussed and agreed on the Anesthesia Plan with the CRNA..

## 2024-08-07 NOTE — ANESTHESIA POSTPROCEDURE EVALUATION
Post-Op Assessment Note    CV Status:  Stable  Pain Score: 0    Pain management: adequate       Mental Status:  Alert and awake   Hydration Status:  Euvolemic   PONV Controlled:  Controlled   Airway Patency:  Patent     Post Op Vitals Reviewed: Yes    No anethesia notable event occurred.    Staff: Anesthesiologist, CRNA               BP 96/54 (08/07/24 1332)    Temp      Pulse (!) 50 (08/07/24 1332)   Resp 16 (08/07/24 1332)    SpO2 98 % (08/07/24 1332)

## 2024-08-07 NOTE — H&P
History and Physical -  [facility]  Narda Cook 44 y.o. female MRN: [id number]                  HPI: Narda Cook is a 44 y.o. year old female who presents for history of GERD and polyps      REVIEW OF SYSTEMS: Per the HPI, and otherwise unremarkable.    Historical Information   Past Medical History:   Diagnosis Date    Anemia     Asthma     Genital warts 2003    Went i was pregnan    Migraine 1999     Past Surgical History:   Procedure Laterality Date    ANKLE ARTHROSCOPY      BARIATRIC SURGERY  2009    [place]    GASTRIC BYPASS LAPAROSCOPIC       Social History   Social History     Substance and Sexual Activity   Alcohol Use Yes    Comment: Social     Social History     Substance and Sexual Activity   Drug Use Never     Social History     Tobacco Use   Smoking Status Never   Smokeless Tobacco Never     Family History   Problem Relation Age of Onset    Diabetes Mother     Thyroid disease Mother     No Known Problems Sister     No Known Problems Daughter     Breast cancer Maternal Grandmother     Colon cancer Maternal Grandmother     No Known Problems Maternal Grandfather     No Known Problems Paternal Grandmother     No Known Problems Paternal Grandfather     No Known Problems Maternal Aunt     No Known Problems Maternal Aunt     No Known Problems Maternal Aunt     No Known Problems Maternal Aunt     No Known Problems Maternal Aunt     No Known Problems Maternal Aunt     No Known Problems Maternal Aunt        Meds/Allergies       Current Outpatient Medications:     azelastine (OPTIVAR) 0.05 % ophthalmic solution    Cholecalciferol (Vitamin D-3) 125 MCG (5000 UT) TABS    cyanocobalamin (VITAMIN B-12) 100 mcg tablet    ferrous sulfate 325 (65 FE) MG EC tablet    fluticasone (FLONASE) 50 mcg/act nasal spray    folic acid (FOLVITE) 1 mg tablet    Multiple Vitamin (multivitamin) capsule    acetaminophen (TYLENOL) 650 mg CR tablet    albuterol (PROVENTIL HFA,VENTOLIN HFA) 90 mcg/act  inhaler    cyclobenzaprine (FLEXERIL) 10 mg tablet    omeprazole (PriLOSEC) 20 mg delayed release capsule    polyethylene glycol-electrolytes (NULYTELY) 4000 mL solution    triamcinolone (KENALOG) 0.1 % cream    Current Facility-Administered Medications:     lactated ringers infusion, 125 mL/hr, Intravenous, Continuous    Allergies   Allergen Reactions    Shellfish Allergy - Food Allergy Dermatitis, Itching, Palpitations, Rash, Shortness Of Breath, Swelling, Throat Swelling and Vomiting    Contrast [Iodinated Contrast Media] Hives       Objective     /71   Pulse 59   Temp 98 °F (36.7 °C) (Temporal)   Resp 18   SpO2 99%       PHYSICAL EXAM    Gen: NAD  Head: NCAT  CV: RRR  CHEST: Clear  ABD: soft, NT/ND  EXT: no edema      ASSESSMENT/PLAN:  This is a 44 y.o. year old female here for ED colonoscopy, and she is stable and optimized for her procedure.

## 2024-08-12 PROCEDURE — 88305 TISSUE EXAM BY PATHOLOGIST: CPT | Performed by: SPECIALIST

## 2024-08-14 NOTE — RESULT ENCOUNTER NOTE
Please call the patient regarding her abnormal result.  Colon biopsy no colitis.  Repeat colonoscopy in 5 years because of his family history.  EGD biopsy mild gastritis, Follow up colonscopy in 5 years  Follow up in my office as needed

## 2024-10-08 ENCOUNTER — APPOINTMENT (OUTPATIENT)
Dept: LAB | Facility: CLINIC | Age: 44
End: 2024-10-08
Payer: COMMERCIAL

## 2024-10-08 DIAGNOSIS — D50.9 IRON DEFICIENCY ANEMIA, UNSPECIFIED IRON DEFICIENCY ANEMIA TYPE: ICD-10-CM

## 2024-10-08 DIAGNOSIS — E53.8 B12 DEFICIENCY: ICD-10-CM

## 2024-10-08 LAB
ALBUMIN SERPL BCG-MCNC: 4 G/DL (ref 3.5–5)
ALP SERPL-CCNC: 59 U/L (ref 34–104)
ALT SERPL W P-5'-P-CCNC: 15 U/L (ref 7–52)
ANION GAP SERPL CALCULATED.3IONS-SCNC: 8 MMOL/L (ref 4–13)
AST SERPL W P-5'-P-CCNC: 20 U/L (ref 13–39)
BASOPHILS # BLD AUTO: 0.06 THOUSANDS/ΜL (ref 0–0.1)
BASOPHILS NFR BLD AUTO: 1 % (ref 0–1)
BILIRUB SERPL-MCNC: 0.61 MG/DL (ref 0.2–1)
BUN SERPL-MCNC: 13 MG/DL (ref 5–25)
CALCIUM SERPL-MCNC: 8.9 MG/DL (ref 8.4–10.2)
CHLORIDE SERPL-SCNC: 99 MMOL/L (ref 96–108)
CO2 SERPL-SCNC: 29 MMOL/L (ref 21–32)
CREAT SERPL-MCNC: 0.64 MG/DL (ref 0.6–1.3)
EOSINOPHIL # BLD AUTO: 0.15 THOUSAND/ΜL (ref 0–0.61)
EOSINOPHIL NFR BLD AUTO: 2 % (ref 0–6)
ERYTHROCYTE [DISTWIDTH] IN BLOOD BY AUTOMATED COUNT: 13.2 % (ref 11.6–15.1)
GFR SERPL CREATININE-BSD FRML MDRD: 108 ML/MIN/1.73SQ M
GLUCOSE P FAST SERPL-MCNC: 95 MG/DL (ref 65–99)
HCT VFR BLD AUTO: 39.6 % (ref 34.8–46.1)
HGB BLD-MCNC: 12.5 G/DL (ref 11.5–15.4)
IMM GRANULOCYTES # BLD AUTO: 0.01 THOUSAND/UL (ref 0–0.2)
IMM GRANULOCYTES NFR BLD AUTO: 0 % (ref 0–2)
LYMPHOCYTES # BLD AUTO: 1.38 THOUSANDS/ΜL (ref 0.6–4.47)
LYMPHOCYTES NFR BLD AUTO: 21 % (ref 14–44)
MCH RBC QN AUTO: 30.1 PG (ref 26.8–34.3)
MCHC RBC AUTO-ENTMCNC: 31.6 G/DL (ref 31.4–37.4)
MCV RBC AUTO: 95 FL (ref 82–98)
MONOCYTES # BLD AUTO: 0.51 THOUSAND/ΜL (ref 0.17–1.22)
MONOCYTES NFR BLD AUTO: 8 % (ref 4–12)
NEUTROPHILS # BLD AUTO: 4.41 THOUSANDS/ΜL (ref 1.85–7.62)
NEUTS SEG NFR BLD AUTO: 68 % (ref 43–75)
NRBC BLD AUTO-RTO: 0 /100 WBCS
PLATELET # BLD AUTO: 288 THOUSANDS/UL (ref 149–390)
PMV BLD AUTO: 10.7 FL (ref 8.9–12.7)
POTASSIUM SERPL-SCNC: 4.4 MMOL/L (ref 3.5–5.3)
PROT SERPL-MCNC: 7.1 G/DL (ref 6.4–8.4)
RBC # BLD AUTO: 4.15 MILLION/UL (ref 3.81–5.12)
SODIUM SERPL-SCNC: 136 MMOL/L (ref 135–147)
VIT B12 SERPL-MCNC: 935 PG/ML (ref 180–914)
WBC # BLD AUTO: 6.52 THOUSAND/UL (ref 4.31–10.16)

## 2024-10-08 PROCEDURE — 80053 COMPREHEN METABOLIC PANEL: CPT

## 2024-10-08 PROCEDURE — 36415 COLL VENOUS BLD VENIPUNCTURE: CPT

## 2024-10-08 PROCEDURE — 82607 VITAMIN B-12: CPT

## 2024-10-08 PROCEDURE — 85025 COMPLETE CBC W/AUTO DIFF WBC: CPT

## 2024-10-10 ENCOUNTER — OFFICE VISIT (OUTPATIENT)
Dept: FAMILY MEDICINE CLINIC | Facility: CLINIC | Age: 44
End: 2024-10-10

## 2024-10-10 VITALS
BODY MASS INDEX: 38.93 KG/M2 | WEIGHT: 228 LBS | HEIGHT: 64 IN | HEART RATE: 65 BPM | RESPIRATION RATE: 16 BRPM | SYSTOLIC BLOOD PRESSURE: 120 MMHG | OXYGEN SATURATION: 97 % | DIASTOLIC BLOOD PRESSURE: 70 MMHG

## 2024-10-10 DIAGNOSIS — Z00.00 ANNUAL PHYSICAL EXAM: Primary | ICD-10-CM

## 2024-10-10 DIAGNOSIS — L30.9 DERMATITIS: ICD-10-CM

## 2024-10-10 DIAGNOSIS — E53.8 B12 DEFICIENCY: ICD-10-CM

## 2024-10-10 DIAGNOSIS — J30.1 SEASONAL ALLERGIC RHINITIS DUE TO POLLEN: ICD-10-CM

## 2024-10-10 RX ORDER — TRIAMCINOLONE ACETONIDE 1 MG/G
1 CREAM TOPICAL 2 TIMES DAILY
Qty: 30 G | Refills: 1 | Status: SHIPPED | OUTPATIENT
Start: 2024-10-10

## 2024-10-10 RX ORDER — FLUTICASONE PROPIONATE 50 MCG
2 SPRAY, SUSPENSION (ML) NASAL DAILY
Qty: 48 ML | Refills: 1 | Status: SHIPPED | OUTPATIENT
Start: 2024-10-10

## 2024-10-10 NOTE — PROGRESS NOTES
Adult Annual Physical  Name: Narda Cook      : 1980      MRN: [id number]  Encounter Provider: Kelly Bernabe MD  Encounter Date: 10/10/2024   Encounter department: [facility]    Assessment & Plan  Annual physical exam    Orders:    CBC and differential; Future    Lipid panel; Future    Comprehensive metabolic panel; Future    TSH, 3rd generation; Future    BMI 39.0-39.9,adult         Seasonal allergic rhinitis due to pollen    Orders:    fluticasone (FLONASE) 50 mcg/act nasal spray; 2 sprays into each nostril daily    Dermatitis  Some dry skin intermittently on the arms  Orders:    CBC and differential; Future    triamcinolone (KENALOG) 0.1 % cream; Apply 1 Application topically 2 (two) times a day To affected area    B12 deficiency  Now she take low-dose 100 mcg daily, continue same she has history of bariatric surgery in the past  Orders:    Vitamin B12; Future    Immunizations and preventive care screenings were discussed with patient today. Appropriate education was printed on patient's after visit summary.    Counseling:  Dental Health: discussed importance of regular tooth brushing, flossing, and dental visits.  Exercise: the importance of regular exercise/physical activity was discussed. Recommend exercise 3-5 times per week for at least 30 minutes.       Depression Screening and Follow-up Plan: Patient was screened for depression during today's encounter. They screened negative with a PHQ-2 score of 0.        History of Present Illness     Adult Annual Physical:  Patient presents for annual physical. physical.     Diet and Physical Activity:  - Diet/Nutrition: well balanced diet.  - Exercise: walking.    Depression Screening:  - PHQ-2 Score: 0    General Health:  - Sleep: sleeps well.  - Hearing: normal hearing bilateral ears.  - Dental: regular dental visits.    /GYN Health:    - Menopause: premenopausal.   - History of STDs: no    Review of Systems   Constitutional:   "Negative for activity change, appetite change, chills, fatigue, fever and unexpected weight change.   HENT:  Negative for congestion, ear discharge, ear pain, nosebleeds, postnasal drip, rhinorrhea, sinus pressure, sneezing, sore throat, trouble swallowing and voice change.    Eyes:  Negative for photophobia, pain, discharge, redness and itching.   Respiratory:  Negative for cough, chest tightness, shortness of breath and wheezing.    Cardiovascular:  Negative for chest pain, palpitations and leg swelling.   Gastrointestinal:  Negative for abdominal pain, constipation, diarrhea, nausea and vomiting.        Gerd     Endocrine: Negative for polyuria.   Genitourinary:  Negative for dysuria, frequency and urgency.   Musculoskeletal:  Negative for arthralgias, back pain, myalgias and neck pain.   Skin:  Negative for color change, pallor and rash.   Allergic/Immunologic: Negative for environmental allergies and food allergies.   Neurological:  Negative for dizziness, weakness, light-headedness and headaches.   Hematological:  Negative for adenopathy. Does not bruise/bleed easily.   Psychiatric/Behavioral:  Negative for behavioral problems. The patient is not nervous/anxious.          Objective     /70   Pulse 65   Resp 16   Ht 5' 4\" (1.626 m)   Wt 103 kg (228 lb)   SpO2 97%   BMI 39.14 kg/m²     Physical Exam  Vitals and nursing note reviewed.   Constitutional:       General: She is not in acute distress.     Appearance: Normal appearance. She is not ill-appearing.   HENT:      Head: Normocephalic and atraumatic.      Right Ear: Tympanic membrane and ear canal normal. There is no impacted cerumen.      Left Ear: Tympanic membrane and ear canal normal. There is no impacted cerumen.      Nose: Nose normal. No congestion or rhinorrhea.      Mouth/Throat:      Mouth: Mucous membranes are moist.      Pharynx: Oropharynx is clear. No oropharyngeal exudate or posterior oropharyngeal erythema.   Eyes:      General: No " scleral icterus.        Right eye: No discharge.         Left eye: No discharge.      Extraocular Movements: Extraocular movements intact.      Conjunctiva/sclera: Conjunctivae normal.      Pupils: Pupils are equal, round, and reactive to light.   Cardiovascular:      Rate and Rhythm: Normal rate and regular rhythm.      Heart sounds: Normal heart sounds. No murmur heard.  Pulmonary:      Effort: Pulmonary effort is normal.      Breath sounds: Normal breath sounds. No wheezing or rales.   Abdominal:      General: Abdomen is flat. Bowel sounds are normal. There is no distension.      Palpations: Abdomen is soft. There is no mass.      Tenderness: There is no abdominal tenderness.   Musculoskeletal:         General: No swelling, tenderness or deformity. Normal range of motion.      Cervical back: Normal range of motion and neck supple. No muscular tenderness.      Right lower leg: No edema.      Left lower leg: No edema.   Lymphadenopathy:      Cervical: No cervical adenopathy.   Skin:     Coloration: Skin is not jaundiced or pale.      Findings: No erythema, lesion or rash.   Neurological:      General: No focal deficit present.      Mental Status: She is alert and oriented to person, place, and time.      Gait: Gait normal.   Psychiatric:         Mood and Affect: Mood normal.         Behavior: Behavior normal.

## 2024-10-10 NOTE — ASSESSMENT & PLAN NOTE
Orders:    CBC and differential; Future    Lipid panel; Future    Comprehensive metabolic panel; Future    TSH, 3rd generation; Future    
  Orders:    fluticasone (FLONASE) 50 mcg/act nasal spray; 2 sprays into each nostril daily    
Now she take low-dose 100 mcg daily, continue same she has history of bariatric surgery in the past  Orders:    Vitamin B12; Future    
Some dry skin intermittently on the arms  Orders:    CBC and differential; Future    triamcinolone (KENALOG) 0.1 % cream; Apply 1 Application topically 2 (two) times a day To affected area    
36.7

## 2024-10-10 NOTE — PATIENT INSTRUCTIONS
"Patient Education     Routine physical for adults   The Basics   Written by the doctors and editors at [facility]   What is a physical? -- A physical is a routine visit, or \"check-up,\" with your doctor. You might also hear it called a \"wellness visit\" or \"preventive visit.\"  During each visit, the doctor will:   Ask about your physical and mental health   Ask about your habits, behaviors, and lifestyle   Do an exam   Give you vaccines if needed   Talk to you about any medicines you take   Give advice about your health   Answer your questions  Getting regular check-ups is an important part of taking care of your health. It can help your doctor find and treat any problems you have. But it's also important for preventing health problems.  A routine physical is different from a \"sick visit.\" A sick visit is when you see a doctor because of a health concern or problem. Since physicals are scheduled ahead of time, you can think about what you want to ask the doctor.  How often should I get a physical? -- It depends on your age and health. In general, for people age 21 years and older:   If you are younger than 50 years, you might be able to get a physical every 3 years.   If you are 50 years or older, your doctor might recommend a physical every year.  If you have an ongoing health condition, like diabetes or high blood pressure, your doctor will probably want to see you more often.  What happens during a physical? -- In general, each visit will include:   Physical exam - The doctor or nurse will check your height, weight, heart rate, and blood pressure. They will also look at your eyes and ears. They will ask about how you are feeling and whether you have any symptoms that bother you.   Medicines - It's a good idea to bring a list of all the medicines you take to each doctor visit. Your doctor will talk to you about your medicines and answer any questions. Tell them if you are having any side effects that bother you. You " "should also tell them if you are having trouble paying for any of your medicines.   Habits and behaviors - This includes:   Your diet   Your exercise habits   Whether you smoke, drink alcohol, or use drugs   Whether you are sexually active   Whether you feel safe at home  Your doctor will talk to you about things you can do to improve your health and lower your risk of health problems. They will also offer help and support. For example, if you want to quit smoking, they can give you advice and might prescribe medicines. If you want to improve your diet or get more physical activity, they can help you with this, too.   Lab tests, if needed - The tests you get will depend on your age and situation. For example, your doctor might want to check your:   Cholesterol   Blood sugar   Iron level   Vaccines - The recommended vaccines will depend on your age, health, and what vaccines you already had. Vaccines are very important because they can prevent certain serious or deadly infections.   Discussion of screening - \"Screening\" means checking for diseases or other health problems before they cause symptoms. Your doctor can recommend screening based on your age, risk, and preferences. This might include tests to check for:   Cancer, such as breast, prostate, cervical, ovarian, colorectal, prostate, lung, or skin cancer   Sexually transmitted infections, such as chlamydia and gonorrhea   Mental health conditions like depression and anxiety  Your doctor will talk to you about the different types of screening tests. They can help you decide which screenings to have. They can also explain what the results might mean.   Answering questions - The physical is a good time to ask the doctor or nurse questions about your health. If needed, they can refer you to other doctors or specialists, too.  Adults older than 65 years often need other care, too. As you get older, your doctor will talk to you about:   How to prevent falling at " home   Hearing or vision tests   Memory testing   How to take your medicines safely   Making sure that you have the help and support you need at home  All topics are updated as new evidence becomes available and our peer review process is complete.  This topic retrieved from [employer] on: May 02, 2024.  Topic 111827 Version 1.0  Release: 32.4.3 - C32.122  © 2024 UpToDate, Inc. and/or its affiliates. All rights reserved.  Consumer Information Use and Disclaimer   Disclaimer: This generalized information is a limited summary of diagnosis, treatment, and/or medication information. It is not meant to be comprehensive and should be used as a tool to help the user understand and/or assess potential diagnostic and treatment options. It does NOT include all information about conditions, treatments, medications, side effects, or risks that may apply to a specific patient. It is not intended to be medical advice or a substitute for the medical advice, diagnosis, or treatment of a health care provider based on the health care provider's examination and assessment of a patient's specific and unique circumstances. Patients must speak with a health care provider for complete information about their health, medical questions, and treatment options, including any risks or benefits regarding use of medications. This information does not endorse any treatments or medications as safe, effective, or approved for treating a specific patient. UpToDate, Inc. and its affiliates disclaim any warranty or liability relating to this information or the use thereof.The use of this information is governed by the Terms of Use, available at https://www.[employer] Systemsuwer.com/en/know/clinical-effectiveness-terms. 2024© UpToDate, Inc. and its affiliates and/or licensors. All rights reserved.  Copyright   © 2024 UpToDate, Inc. and/or its affiliates. All rights reserved.    Patient Education     Dieta baja en grasas   Conceptos Básicos   Redactado por los  "médicos y editores de [employer]   ¿Qué son las grasas? -- La grasa presente en los alimentos que consume suele clasificarse en dos grupos:   Grasas \"saludables\" - Estas grasas son monoinsaturadas o poliinsaturadas. Suelen ser más líquidas a temperatura ambiente. Las grasas saludables están presentes en alimentos tales jose el aceite de suarez, el aceite de canola y el aceite de sésamo. También se encuentran en los thi secos, las semillas, los aguacates y las mantequillas de thi secos.   Grasas \"no saludables\" - Son las grasas saturadas y las grasas trans. Las grasas saturadas son de origen animal. Las grasas trans son grasas artificiales, jose los aceites parcialmente hidrogenados. Estas grasas elevan el colesterol. Las grasas no saludables tienden a ser más sólidas a temperatura ambiente. Se encuentran en las arin, la yema de huevo, la mantequilla, el queso y los productos lácteos enteros. También están presentes en algunos alimentos fritos, la mantequilla, la margarina y productos horneados jose las galletas o los pasteles.  ¿Por qué necesito lanny dieta baja en grasas? -- Las clases de grasas que consume y la cantidad pueden influir en mckeon koki. [place] es especialmente cierto si tiene padecimientos específicos jose arterias bloqueadas o problemas de la vesícula biliar. Consumir menos grasas no saludables es lanny forma de mejorar mckeon koki.  Algunas personas intentan consumir menos grasas porque quieren adelgazar o no aumentar de peso. Sin embargo, esto no siempre da resultado. Eso se debe a que el aumento de peso se relaciona con la cantidad de calorías que consume, sin importar de qué alimentos provengan. Consumir menos grasas no saludables puede ser un componente importante de un plan para bajar de peso, kenyetta también es importante que sepa cuántas calorías le aportan otros alimentos que consume.  ¿Qué puedo comer y beber con lanny dieta baja en grasas? -- Elija alimentos que contengan grasas saludables.  Los " alimentos que tienen grasas saludables son, entre otros:   Aceite de canola, maní y suarez   Aceite de cártamo, girasol, soya y maíz   [place] de nogal, almendras, pecanas, avellanas, anacardos y maníes   Semillas de calabaza, sésamo, preet y girasol   Salmón, atún y algunos otros pescados   Tofu   Leche de soya   Aguacate  Otros alimentos saludables con menores cantidades de grasas son:   Leche, yogur y queso sin grasa o bajos en grasa   Queso crema y crema agria dietéticos, bajos en grasa o sin grasa   Frijoles, lentejas y tofu secos   Frutas y verduras   Panes, cereales, pasta y arroz integrales   Alimentos ricos en fibra, jose el cereal de vern, las frutas, las legumbres y los thi secos. Estos contienen fibra soluble, la cual ayuda a bajar el colesterol.   Fiambre de jamón, pavo, pechuga de francisco javier y carne asada magra  ¿Qué alimentos y bebidas glenn limitar con lanny dieta baja en grasas? -- Es importante limitar ciertos alimentos que contienen grasas no saludables.  Limite estos tipos de alimentos que contienen grasas saturadas:   Productos lácteos enteros, jose queso, helado, leche entera y crema de leche (kandy)   Nydia grasosas, jose la carne de res, el lazar, las aves de lorenzo con la piel, el tocino, los hot dogs y las salchichas   Fiambres tales jose la mortadela, el peperoni y el salami   Mantequilla y manteca de cerdo   Aceites de holt y de paradise   Mayonesa, aderezos para ensaladas y salsas  Limite estos tipos de alimentos que podrían tener grasas trans:   Granola, dulces y productos horneados tales jose galletas, tartas, donas y pastelitos   Masa de pizza y de tarta empaquetadas   Alimentos fritos   Comidas preparadas congeladas   Sahil fritas comerciales y galletas de sal   Palomitas de maíz para microondas   Margarina en anh y mantecas vegetales  ¿Qué más glenn saber? -- No tiene que eliminar toda la grasa de mckeon dieta. En cambio, preste atención a la cantidad y a los tipos de grasa que consume.   Emily  las etiquetas de los alimentos comprados (figura 1) para averiguar cuánta grasa contiene cada robinson. Si en la etiqueta se indican grasas totales de menos del 5 %, significa que el alimento tiene un contenido de grasa bajo. Si en la etiqueta se indican grasas totales de más del 20 %, significa que el alimento tiene un contenido de grasa alto. Evite los alimentos con “aceite parcialmente hidrogenado” en la lista de ingredientes. Eso significa que el alimento contiene grasa trans.   Cambie mckeon modo de cocinar para ayudar a reducir la cantidad de grasa que le aportan parish alimentos:   Retire la grasa de la carne y la piel de las aves de lorenzo antes de cocinarlas   Cocine las aves de lorenzo, el pescado y las arin magras al horno, a la keon o hervidos   Escurra y deseche la grasa que se desprende de la carne popeye la cocción   Trate de agregar poco o nada de grasa a los alimentos   Use aceite de suarez o de canola para cocinar u hornear   Cocine las verduras al vapor   Use hierbas o adobos sin aceite para dakota sabor a los alimentos  Todos los artículos se actualizan a medida que se descubre nueva evidencia y culmina nuestro proceso de evaluación por homólogos   Jonathan artículo se recuperó de UpToDate el: Mar 13, 2024.  Artículo 135644 Versión [id number].es-419.1  Release: 32.2.4 - C32.71  © 2024 UpToDate, Inc. Todos los derechos reservados.  figura 1: Grasas en la etiqueta de información nutricional     Jonathan es un ejemplo de lanny etiqueta de información nutricional. Para saber la cantidad y el tipo de grasas de un alimento, busque las líneas que dicen “Grasa saturada” y “Grasa trans”. También es importante observar el tamaño de la porción. Cada porción de jonathan alimento tiene 3 gramos de grasa saturada y 2 gramos de grasa trans, y cada porción es de 2 cucharadas (32 gramos).  Gráfico [id number] Versión 1.0  Exención de responsabilidad y uso de la información del consumidor   Descargo de responsabilidad: esta información generalizada  es un resumen limitado de información sobre el diagnóstico, el tratamiento y/o los medicamentos. No pretende ser exhaustiva y se debe utilizar jose herramienta para ayudar al usuario a comprender y/o evaluar las posibles opciones de diagnóstico y tratamiento. No incluye toda la información sobre afecciones, tratamientos, medicamentos, efectos secundarios o riesgos puedan ser aplicables a un paciente específico. No tiene el propósito de servir jose recomendación médica ni de sustituir la recomendación médica, el diagnóstico o el tratamiento de un profesional de atención médica que se base en el examen y la evaluación de jonathan profesional de la koki respecto a las circunstancias específicas y únicas del paciente. Los pacientes deben hablar con un profesional de atención médica para obtener información completa sobre mckeon koki, cuestiones médicas y opciones de tratamiento, incluidos los riesgos o los beneficios relacionados con el uso de medicamentos. Esta información no certifica que los tratamientos o medicamentos tam seguros, eficaces o estén aprobados para tratar a un paciente específico. Up[employer], Inc[employer] y parish afiliados renuncian a cualquier garantía o responsabilidad relacionada con esta información o el uso de la misma.El uso de esta información está sujeto a las Condiciones de uso, disponibles en https://www.[employer].com/en/know/clinical-effectiveness-terms. 2024© UpToDate, Inc. y parish afiliados y/o licenciantes. Todos los derechos reservados.  Copyright   © 2024 UpToDate, Inc. Todos los derechos reservados.

## 2024-12-10 DIAGNOSIS — D50.9 IRON DEFICIENCY ANEMIA, UNSPECIFIED IRON DEFICIENCY ANEMIA TYPE: ICD-10-CM

## 2024-12-12 DIAGNOSIS — Z11.1 SCREENING-PULMONARY TB: Primary | ICD-10-CM

## 2024-12-12 RX ORDER — FERROUS SULFATE 325(65) MG
1 TABLET, DELAYED RELEASE (ENTERIC COATED) ORAL
Qty: 90 TABLET | Refills: 2 | Status: SHIPPED | OUTPATIENT
Start: 2024-12-12

## 2024-12-18 ENCOUNTER — APPOINTMENT (OUTPATIENT)
Dept: LAB | Facility: CLINIC | Age: 44
End: 2024-12-18
Payer: COMMERCIAL

## 2024-12-18 DIAGNOSIS — Z11.1 SCREENING-PULMONARY TB: ICD-10-CM

## 2024-12-18 PROCEDURE — 36415 COLL VENOUS BLD VENIPUNCTURE: CPT

## 2024-12-18 PROCEDURE — 86480 TB TEST CELL IMMUN MEASURE: CPT

## 2024-12-19 ENCOUNTER — RESULTS FOLLOW-UP (OUTPATIENT)
Dept: FAMILY MEDICINE CLINIC | Facility: CLINIC | Age: 44
End: 2024-12-19

## 2024-12-19 LAB
GAMMA INTERFERON BACKGROUND BLD IA-ACNC: <0 IU/ML
M TB IFN-G BLD-IMP: NEGATIVE
M TB IFN-G CD4+ BCKGRND COR BLD-ACNC: 0.01 IU/ML
M TB IFN-G CD4+ BCKGRND COR BLD-ACNC: 0.01 IU/ML
MITOGEN IGNF BCKGRD COR BLD-ACNC: 6.74 IU/ML

## 2025-01-17 DIAGNOSIS — N93.9 ABNORMAL UTERINE BLEEDING (AUB): ICD-10-CM

## 2025-01-17 RX ORDER — DROSPIRENONE 4 MG/1
1 TABLET, FILM COATED ORAL EVERY MORNING
Qty: 28 TABLET | Refills: 1 | Status: SHIPPED | OUTPATIENT
Start: 2025-01-17

## 2025-01-30 ENCOUNTER — TELEPHONE (OUTPATIENT)
Age: 45
End: 2025-01-30

## 2025-01-30 NOTE — TELEPHONE ENCOUNTER
Spoke to Patient via .    Received call from Patient for New Patient - [place]. Scheduled 11/4/25 10:00 [facility] Verified insurance, provided CV address.     Also offered 3/2025-4/2025 [facility] location, but informed Patient they need to check with their insurance prior to scheduling.     Patient verbalized understanding, and will check if their insurance is accepted in [place] so they can reschedule into [facility] addr.

## 2025-04-11 ENCOUNTER — APPOINTMENT (OUTPATIENT)
Dept: LAB | Facility: CLINIC | Age: 45
End: 2025-04-11
Payer: COMMERCIAL

## 2025-04-11 DIAGNOSIS — E53.8 B12 DEFICIENCY: ICD-10-CM

## 2025-04-11 DIAGNOSIS — Z00.00 ANNUAL PHYSICAL EXAM: ICD-10-CM

## 2025-04-11 DIAGNOSIS — L30.9 DERMATITIS: ICD-10-CM

## 2025-04-11 LAB
ALBUMIN SERPL BCG-MCNC: 4.1 G/DL (ref 3.5–5)
ALP SERPL-CCNC: 52 U/L (ref 34–104)
ALT SERPL W P-5'-P-CCNC: 11 U/L (ref 7–52)
ANION GAP SERPL CALCULATED.3IONS-SCNC: 7 MMOL/L (ref 4–13)
AST SERPL W P-5'-P-CCNC: 15 U/L (ref 13–39)
BASOPHILS # BLD AUTO: 0.05 THOUSANDS/ÂΜL (ref 0–0.1)
BASOPHILS NFR BLD AUTO: 1 % (ref 0–1)
BILIRUB SERPL-MCNC: 0.45 MG/DL (ref 0.2–1)
BUN SERPL-MCNC: 12 MG/DL (ref 5–25)
CALCIUM SERPL-MCNC: 9.1 MG/DL (ref 8.4–10.2)
CHLORIDE SERPL-SCNC: 102 MMOL/L (ref 96–108)
CHOLEST SERPL-MCNC: 176 MG/DL (ref ?–200)
CO2 SERPL-SCNC: 28 MMOL/L (ref 21–32)
CREAT SERPL-MCNC: 0.79 MG/DL (ref 0.6–1.3)
EOSINOPHIL # BLD AUTO: 0.04 THOUSAND/ÂΜL (ref 0–0.61)
EOSINOPHIL NFR BLD AUTO: 1 % (ref 0–6)
ERYTHROCYTE [DISTWIDTH] IN BLOOD BY AUTOMATED COUNT: 12.6 % (ref 11.6–15.1)
GFR SERPL CREATININE-BSD FRML MDRD: 90 ML/MIN/1.73SQ M
GLUCOSE P FAST SERPL-MCNC: 93 MG/DL (ref 65–99)
HCT VFR BLD AUTO: 40.5 % (ref 34.8–46.1)
HDLC SERPL-MCNC: 88 MG/DL
HGB BLD-MCNC: 12.4 G/DL (ref 11.5–15.4)
IMM GRANULOCYTES # BLD AUTO: 0.01 THOUSAND/UL (ref 0–0.2)
IMM GRANULOCYTES NFR BLD AUTO: 0 % (ref 0–2)
LDLC SERPL CALC-MCNC: 77 MG/DL (ref 0–100)
LYMPHOCYTES # BLD AUTO: 1.18 THOUSANDS/ÂΜL (ref 0.6–4.47)
LYMPHOCYTES NFR BLD AUTO: 17 % (ref 14–44)
MCH RBC QN AUTO: 28.8 PG (ref 26.8–34.3)
MCHC RBC AUTO-ENTMCNC: 30.6 G/DL (ref 31.4–37.4)
MCV RBC AUTO: 94 FL (ref 82–98)
MONOCYTES # BLD AUTO: 0.42 THOUSAND/ÂΜL (ref 0.17–1.22)
MONOCYTES NFR BLD AUTO: 6 % (ref 4–12)
NEUTROPHILS # BLD AUTO: 5.38 THOUSANDS/ÂΜL (ref 1.85–7.62)
NEUTS SEG NFR BLD AUTO: 75 % (ref 43–75)
NONHDLC SERPL-MCNC: 88 MG/DL
NRBC BLD AUTO-RTO: 0 /100 WBCS
PLATELET # BLD AUTO: 298 THOUSANDS/UL (ref 149–390)
PMV BLD AUTO: 11.2 FL (ref 8.9–12.7)
POTASSIUM SERPL-SCNC: 3.8 MMOL/L (ref 3.5–5.3)
PROT SERPL-MCNC: 7.4 G/DL (ref 6.4–8.4)
RBC # BLD AUTO: 4.3 MILLION/UL (ref 3.81–5.12)
SODIUM SERPL-SCNC: 137 MMOL/L (ref 135–147)
TRIGL SERPL-MCNC: 55 MG/DL (ref ?–150)
TSH SERPL DL<=0.05 MIU/L-ACNC: 1.51 UIU/ML (ref 0.45–4.5)
VIT B12 SERPL-MCNC: 1110 PG/ML (ref 180–914)
WBC # BLD AUTO: 7.08 THOUSAND/UL (ref 4.31–10.16)

## 2025-04-11 PROCEDURE — 80053 COMPREHEN METABOLIC PANEL: CPT

## 2025-04-11 PROCEDURE — 80061 LIPID PANEL: CPT

## 2025-04-11 PROCEDURE — 36415 COLL VENOUS BLD VENIPUNCTURE: CPT

## 2025-04-11 PROCEDURE — 84443 ASSAY THYROID STIM HORMONE: CPT

## 2025-04-11 PROCEDURE — 85025 COMPLETE CBC W/AUTO DIFF WBC: CPT

## 2025-04-11 PROCEDURE — 82607 VITAMIN B-12: CPT

## 2025-04-17 ENCOUNTER — OFFICE VISIT (OUTPATIENT)
Dept: FAMILY MEDICINE CLINIC | Facility: CLINIC | Age: 45
End: 2025-04-17
Payer: COMMERCIAL

## 2025-04-17 ENCOUNTER — RESULTS FOLLOW-UP (OUTPATIENT)
Dept: FAMILY MEDICINE CLINIC | Facility: CLINIC | Age: 45
End: 2025-04-17

## 2025-04-17 ENCOUNTER — TELEPHONE (OUTPATIENT)
Age: 45
End: 2025-04-17

## 2025-04-17 VITALS
DIASTOLIC BLOOD PRESSURE: 72 MMHG | OXYGEN SATURATION: 98 % | HEART RATE: 60 BPM | SYSTOLIC BLOOD PRESSURE: 130 MMHG | RESPIRATION RATE: 16 BRPM | HEIGHT: 64 IN | WEIGHT: 222 LBS | BODY MASS INDEX: 37.9 KG/M2

## 2025-04-17 DIAGNOSIS — R19.05 PERIUMBILICAL MASS: Primary | ICD-10-CM

## 2025-04-17 DIAGNOSIS — D25.1 INTRAMURAL AND SUBSEROUS LEIOMYOMA OF UTERUS: ICD-10-CM

## 2025-04-17 DIAGNOSIS — E53.8 B12 DEFICIENCY: ICD-10-CM

## 2025-04-17 DIAGNOSIS — Z12.31 ENCOUNTER FOR SCREENING MAMMOGRAM FOR BREAST CANCER: ICD-10-CM

## 2025-04-17 DIAGNOSIS — D25.2 INTRAMURAL AND SUBSEROUS LEIOMYOMA OF UTERUS: ICD-10-CM

## 2025-04-17 DIAGNOSIS — J45.20 MILD INTERMITTENT ASTHMA WITHOUT COMPLICATION: ICD-10-CM

## 2025-04-17 DIAGNOSIS — Z98.84 HISTORY OF ROUX-EN-Y GASTRIC BYPASS: ICD-10-CM

## 2025-04-17 DIAGNOSIS — D50.9 IRON DEFICIENCY ANEMIA, UNSPECIFIED IRON DEFICIENCY ANEMIA TYPE: ICD-10-CM

## 2025-04-17 PROCEDURE — 99214 OFFICE O/P EST MOD 30 MIN: CPT | Performed by: FAMILY MEDICINE

## 2025-04-17 RX ORDER — PYRIDOXINE HCL (VITAMIN B6) 50 MG
50 TABLET ORAL DAILY
Start: 2025-04-17

## 2025-04-17 NOTE — ASSESSMENT & PLAN NOTE
Continue iron supplement, hemoglobin stable    Orders:  •  CBC and differential; Future  •  Comprehensive metabolic panel; Future

## 2025-04-17 NOTE — TELEPHONE ENCOUNTER
Patient has irregular bleeding every 2 weeks  Patient sees Dr Ignacio in the [place] office , first appt available is in Sept  - pt will go to any of the offices

## 2025-04-17 NOTE — ASSESSMENT & PLAN NOTE
She takes 100 mcg B12 daily, history of gastric bypass surgery, advised to reduce the dose to 50 mcg daily as levels are high  Orders:  •  vitamin B-12 (CYANOCOBALAMIN) 50 MCG TABS; Take 1 tablet (50 mcg total) by mouth daily  •  Vitamin B12; Future

## 2025-04-17 NOTE — ASSESSMENT & PLAN NOTE
She has firm small lump on the left side of the abdomen close to the bellybutton, needs ultrasound to check and also advised to see the gynecologist as previous history of fibroid may be fibroid is protruding out of the pelvis  Orders:  •  US abdomen complete; Future

## 2025-04-17 NOTE — PROGRESS NOTES
"Name: Narda Cook      : 1980      MRN: [id number]  Encounter Provider: Kelly Bernabe MD  Encounter Date: 2025   Encounter department: [facility]  :  Assessment & Plan  Encounter for screening mammogram for breast cancer    Orders:  •  Mammo screening bilateral w 3d and cad; Future    Mild intermittent asthma without complication  Stable       B12 deficiency  She takes 100 mcg B12 daily, history of gastric bypass surgery, advised to reduce the dose to 50 mcg daily as levels are high  Orders:  •  vitamin B-12 (CYANOCOBALAMIN) 50 MCG TABS; Take 1 tablet (50 mcg total) by mouth daily  •  Vitamin B12; Future    History of Deana-en-Y gastric bypass         Iron deficiency anemia, unspecified iron deficiency anemia type    Continue iron supplement, hemoglobin stable    Orders:  •  CBC and differential; Future  •  Comprehensive metabolic panel; Future    Intramural and subserous leiomyoma of uterus         Periumbilical mass  She has firm small lump on the left side of the abdomen close to the bellybutton, needs ultrasound to check and also advised to see the gynecologist as previous history of fibroid may be fibroid is protruding out of the pelvis  Orders:  •  US abdomen complete; Future           History of Present Illness   Follow-up, complains of intermittent discomfort in the left side of abdomen for few months, no nausea vomiting, history of gastric bypass surgery in the past, she also has history of uterine fibroids      Review of Systems   Constitutional: Negative.    Eyes: Negative.    Respiratory: Negative.     Cardiovascular: Negative.    Gastrointestinal: Negative.    Genitourinary: Negative.    Neurological: Negative.    Hematological: Negative.    Psychiatric/Behavioral: Negative.         Objective   /72   Pulse 60   Resp 16   Ht 5' 4\" (1.626 m)   Wt 101 kg (222 lb)   SpO2 98%   BMI 38.11 kg/m²      Physical Exam  Vitals and nursing note reviewed. "   Constitutional:       General: She is not in acute distress.     Appearance: Normal appearance. She is not ill-appearing.   HENT:      Head: Normocephalic and atraumatic.      Nose: Nose normal. No congestion or rhinorrhea.      Mouth/Throat:      Mouth: Mucous membranes are moist.      Pharynx: Oropharynx is clear. No oropharyngeal exudate or posterior oropharyngeal erythema.   Eyes:      General: No scleral icterus.        Right eye: No discharge.         Left eye: No discharge.      Extraocular Movements: Extraocular movements intact.      Conjunctiva/sclera: Conjunctivae normal.      Pupils: Pupils are equal, round, and reactive to light.   Cardiovascular:      Rate and Rhythm: Normal rate and regular rhythm.      Heart sounds: Normal heart sounds. No murmur heard.  Pulmonary:      Effort: Pulmonary effort is normal.      Breath sounds: Normal breath sounds. No wheezing or rales.   Abdominal:      General: Abdomen is flat. Bowel sounds are normal. There is no distension.      Palpations: Abdomen is soft. There is no mass.      Tenderness: There is no abdominal tenderness.   Musculoskeletal:         General: No swelling, tenderness or deformity. Normal range of motion.      Cervical back: Normal range of motion and neck supple. No muscular tenderness.      Right lower leg: No edema.      Left lower leg: No edema.   Lymphadenopathy:      Cervical: No cervical adenopathy.   Skin:     Coloration: Skin is not jaundiced or pale.      Findings: No erythema, lesion or rash.   Neurological:      General: No focal deficit present.      Mental Status: She is alert and oriented to person, place, and time.      Gait: Gait normal.   Psychiatric:         Mood and Affect: Mood normal.         Behavior: Behavior normal.

## 2025-04-18 ENCOUNTER — TELEPHONE (OUTPATIENT)
Dept: OBGYN CLINIC | Facility: CLINIC | Age: 45
End: 2025-04-18

## 2025-04-18 NOTE — TELEPHONE ENCOUNTER
Pt called back, offered appt for 4/22 in [place] with dr. Espinal. Pt confirmed appt scheduled.

## 2025-04-22 ENCOUNTER — OFFICE VISIT (OUTPATIENT)
Dept: OBGYN CLINIC | Facility: CLINIC | Age: 45
End: 2025-04-22
Payer: COMMERCIAL

## 2025-04-22 VITALS
WEIGHT: 220 LBS | HEIGHT: 64 IN | SYSTOLIC BLOOD PRESSURE: 118 MMHG | DIASTOLIC BLOOD PRESSURE: 78 MMHG | BODY MASS INDEX: 37.56 KG/M2

## 2025-04-22 DIAGNOSIS — D25.2 INTRAMURAL AND SUBSEROUS LEIOMYOMA OF UTERUS: ICD-10-CM

## 2025-04-22 DIAGNOSIS — N92.1 MENORRHAGIA WITH IRREGULAR CYCLE: Primary | ICD-10-CM

## 2025-04-22 DIAGNOSIS — N88.2 CERVICAL OS STENOSIS: ICD-10-CM

## 2025-04-22 DIAGNOSIS — D25.1 INTRAMURAL AND SUBSEROUS LEIOMYOMA OF UTERUS: ICD-10-CM

## 2025-04-22 PROCEDURE — 99214 OFFICE O/P EST MOD 30 MIN: CPT | Performed by: STUDENT IN AN ORGANIZED HEALTH CARE EDUCATION/TRAINING PROGRAM

## 2025-04-22 RX ORDER — MISOPROSTOL 200 UG/1
TABLET ORAL
Qty: 1 TABLET | Refills: 0 | Status: SHIPPED | OUTPATIENT
Start: 2025-04-22

## 2025-04-22 NOTE — PROGRESS NOTES
"*Visit conducted in [place]    Name: Narda Cook      : 1980      MRN: [id number]  Encounter Provider: Mica Espinal MD  Encounter Date: 2025   Encounter department: [facility] [place]  :  Assessment & Plan  Menorrhagia with irregular cycle  Bleeding may be worsened by intermittent use of slynd, recommend stopping at this point  Does not want to try any other medications, doesn't want myomectomy, ready for hysterectomy  Recommend Pelvic US  Needs endometrial biopsy since over 1 year  RTO for Preop consult for hyst--recommend keeping ovaries given premenopausal         Intramural and subserous leiomyoma of uterus    Orders:    US pelvis complete w transvaginal; Future    Cervical os stenosis    Orders:    miSOPROStol (Cytotec) 200 mcg tablet; Place in vagina the night before procedure (biopsy)        History of Present Illness   HPI  Narda Cook is a 45 y.o. female who presents for follow-up AUB    Last seen 2024  Ongoing irregular bleeding  Endometrial biopsy 2024: benign  Last pelvic US 2024 two medium sized fibroids with interval growth  Tried armando with bad side effects--shakes, dizziness  Doesn't want depoprovera  Had started slynd    Since last visit, uses slynd but not daily because having diarrhea  In one month uses it like two days on and 3 days off to minimize side effect    No tobacco  No chtn  No hx of dvt/vte  Occasional migraines with aura**    Hx of 1xSVD and gastric sleeve   Last pap 2023: NILM, HPV neg      History obtained from: patient    Review of Systems--per HPI       Objective   /78 (BP Location: Left arm, Patient Position: Sitting, Cuff Size: Large)   Ht 5' 4\" (1.626 m)   Wt 99.8 kg (220 lb)   LMP 2025 (Approximate)   BMI 37.76 kg/m²      Physical Exam  Constitutional:       Appearance: Normal appearance.   HENT:      Head: Normocephalic.   Eyes:      Extraocular Movements: Extraocular movements intact.      " Conjunctiva/sclera: Conjunctivae normal.   Pulmonary:      Effort: Pulmonary effort is normal.   Abdominal:      General: There is no distension.      Palpations: Abdomen is soft.      Tenderness: There is no abdominal tenderness.   Genitourinary:     Comments: Vulva: normal, no lesions  Vagina: normal, no lesions or ttp  Urethra: normal, no lesions, masses or ttp  Bladder: normal, no masses or ttp  Cervix: normal, no lesions, masses or CMT  Uterus: normal-size, normal mobility, minimal descensus  Adnexa: no masses or ttp  Musculoskeletal:         General: Normal range of motion.      Cervical back: Normal range of motion.   Skin:     General: Skin is warm and dry.   Neurological:      General: No focal deficit present.      Mental Status: She is alert.   Psychiatric:         Mood and Affect: Mood normal.         Behavior: Behavior normal.         Thought Content: Thought content normal.         Administrative Statements   I have spent a total time of 30 minutes in caring for this patient on the day of the visit/encounter including Diagnostic results, Risks and benefits of tx options, Importance of tx compliance, Impressions, Counseling / Coordination of care, Documenting in the medical record, Reviewing/placing orders in the medical record (including tests, medications, and/or procedures), and Obtaining or reviewing history  .

## 2025-04-22 NOTE — ASSESSMENT & PLAN NOTE
Bleeding may be worsened by intermittent use of slynd, recommend stopping at this point  Does not want to try any other medications, doesn't want myomectomy, ready for hysterectomy  Recommend Pelvic US  Needs endometrial biopsy since over 1 year  RTO for Preop consult for hyst--recommend keeping ovaries given premenopausal

## 2025-05-29 ENCOUNTER — HOSPITAL ENCOUNTER (OUTPATIENT)
Dept: ULTRASOUND IMAGING | Facility: HOSPITAL | Age: 45
Discharge: HOME | End: 2025-05-29
Attending: FAMILY MEDICINE
Payer: COMMERCIAL

## 2025-05-29 ENCOUNTER — HOSPITAL ENCOUNTER (OUTPATIENT)
Facility: HOSPITAL | Age: 45
Discharge: HOME | End: 2025-05-29
Attending: FAMILY MEDICINE
Payer: COMMERCIAL

## 2025-05-29 VITALS — WEIGHT: 220 LBS | HEIGHT: 64 IN | BODY MASS INDEX: 37.56 KG/M2

## 2025-05-29 DIAGNOSIS — Z12.31 ENCOUNTER FOR SCREENING MAMMOGRAM FOR BREAST CANCER: ICD-10-CM

## 2025-05-29 DIAGNOSIS — R19.05 PERIUMBILICAL MASS: ICD-10-CM

## 2025-05-29 PROCEDURE — 77063 BREAST TOMOSYNTHESIS BI: CPT

## 2025-05-29 PROCEDURE — 77067 SCR MAMMO BI INCL CAD: CPT

## 2025-05-29 PROCEDURE — 76705 ECHO EXAM OF ABDOMEN: CPT

## 2025-05-30 ENCOUNTER — HOSPITAL ENCOUNTER (OUTPATIENT)
Dept: ULTRASOUND IMAGING | Facility: HOSPITAL | Age: 45
Discharge: HOME | End: 2025-05-30
Attending: STUDENT IN AN ORGANIZED HEALTH CARE EDUCATION/TRAINING PROGRAM
Payer: COMMERCIAL

## 2025-05-30 DIAGNOSIS — D25.1 INTRAMURAL AND SUBSEROUS LEIOMYOMA OF UTERUS: ICD-10-CM

## 2025-05-30 DIAGNOSIS — D25.2 INTRAMURAL AND SUBSEROUS LEIOMYOMA OF UTERUS: ICD-10-CM

## 2025-05-30 PROCEDURE — 76830 TRANSVAGINAL US NON-OB: CPT

## 2025-05-30 PROCEDURE — 76856 US EXAM PELVIC COMPLETE: CPT

## 2025-06-02 ENCOUNTER — RESULTS FOLLOW-UP (OUTPATIENT)
Dept: FAMILY MEDICINE CLINIC | Facility: CLINIC | Age: 45
End: 2025-06-02

## 2025-06-03 ENCOUNTER — TELEPHONE (OUTPATIENT)
Age: 45
End: 2025-06-03

## 2025-06-03 ENCOUNTER — TELEPHONE (OUTPATIENT)
Dept: OTHER | Facility: OTHER | Age: 45
End: 2025-06-03

## 2025-06-03 NOTE — TELEPHONE ENCOUNTER
Patient is calling regarding cancelling an appointment.    Date/Time: 6/3/2025 / 10:00 am     Patient was rescheduled: YES [] NO [x]    Patient requesting call back to reschedule: YES [x] NO []

## 2025-06-03 NOTE — TELEPHONE ENCOUNTER
I called patient to rechedule her 6/3/2025 10 am appt, patient did not answer the phone and VM option was not provided when I called.

## 2025-06-04 ENCOUNTER — APPOINTMENT (OUTPATIENT)
Dept: RADIOLOGY | Facility: CLINIC | Age: 45
End: 2025-06-04
Payer: COMMERCIAL

## 2025-06-04 ENCOUNTER — OFFICE VISIT (OUTPATIENT)
Dept: URGENT CARE | Facility: CLINIC | Age: 45
End: 2025-06-04
Payer: COMMERCIAL

## 2025-06-04 VITALS
DIASTOLIC BLOOD PRESSURE: 61 MMHG | SYSTOLIC BLOOD PRESSURE: 118 MMHG | RESPIRATION RATE: 16 BRPM | TEMPERATURE: 97.7 F | WEIGHT: 220 LBS | BODY MASS INDEX: 37.56 KG/M2 | HEART RATE: 60 BPM | OXYGEN SATURATION: 96 % | HEIGHT: 64 IN

## 2025-06-04 DIAGNOSIS — M25.561 ACUTE PAIN OF RIGHT KNEE: ICD-10-CM

## 2025-06-04 DIAGNOSIS — M25.561 ACUTE PAIN OF RIGHT KNEE: Primary | ICD-10-CM

## 2025-06-04 DIAGNOSIS — M17.11 PRIMARY OSTEOARTHRITIS OF RIGHT KNEE: ICD-10-CM

## 2025-06-04 PROCEDURE — 73562 X-RAY EXAM OF KNEE 3: CPT

## 2025-06-04 PROCEDURE — G0382 LEV 3 HOSP TYPE B ED VISIT: HCPCS | Performed by: PHYSICIAN ASSISTANT

## 2025-06-04 NOTE — PROGRESS NOTES
[facility]        NAME: Narda Cook is a 45 y.o. female  : 1980    MRN: [id number]  DATE: 2025  TIME: 6:05 PM    Assessment and Plan   Acute pain of right knee [M25.561]  1. Acute pain of right knee  XR knee 3 vw right non injury      2. Primary osteoarthritis of right knee  Ambulatory Referral to Orthopedic Surgery            Patient Instructions   Xrays show patellofemoral arthritis.  Will refer to ortho for further eval and tx.  Advised pt to avoid all NSAID's due to h/o gastric bypass.   Take Tylenol for pain per bottle direction  Try Aspercreme or voltaren gel for pain  Ice the knee  Follow up with orthopedics   Follow up with PCP in 3-5 days.  Proceed to  ER if symptoms worsen.    If tests have been performed at [facility], our office will contact you with results if changes need to be made to the care plan discussed with you at the visit.  You can review your full results on [facility] MyChart.    Chief Complaint     Chief Complaint   Patient presents with    Knee Pain     Pt was sitting on her couch and she felt something pop in her left knee she has been having pain since Friday. She denies any injury.          History of Present Illness       Knee Pain       46 y/o female presents for evaluation of right knee pain which started Friday after arising from a seated position.  She states she felt a pop as she arose from the .  Since that time she c/o pain under the patella and in the popliteal fossa.  She does report some crepitus but denies locking/catching/instability.    She denies previous knee surgeries.  She states the pain is localized to the knee and does not radiate into the calf or thigh.   Xray report from  was reviewed showing mild medial compartment narrowing, films could not be directly visualized.   She took Advil with mild improvement and tried one of her 's Mobic tabs which helped but caused GI upset.  She is s/p gastric bypass.    Review of Systems  "  Review of Systems   Constitutional:  Negative for chills and fever.   HENT:  Negative for ear pain and sore throat.    Eyes:  Negative for pain and visual disturbance.   Respiratory:  Negative for cough and shortness of breath.    Cardiovascular:  Negative for chest pain and palpitations.   Gastrointestinal:  Negative for abdominal pain and vomiting.   Genitourinary:  Negative for dysuria and hematuria.   Musculoskeletal:  Positive for gait problem and myalgias. Negative for arthralgias and back pain.   Skin:  Negative for color change and rash.   Neurological:  Negative for seizures and syncope.   All other systems reviewed and are negative.        Current Medications     Current Medications[1]    Current Allergies     Allergies as of 06/04/2025 - Reviewed 06/04/2025   Allergen Reaction Noted    Shellfish allergy - food allergy Dermatitis, Itching, Palpitations, Rash, Shortness Of Breath, Swelling, Throat Swelling, and Vomiting 12/15/1999    Contrast [iodinated contrast media] Hives 12/22/2022            The following portions of the patient's history were reviewed and updated as appropriate: allergies, current medications, past family history, past medical history, past social history, past surgical history and problem list.     Past Medical History[2]    Past Surgical History[3]    Family History[4]      Medications have been verified.        Objective   /61   Pulse 60   Temp 97.7 °F (36.5 °C)   Resp 16   Ht 5' 4\" (1.626 m)   Wt 99.8 kg (220 lb)   LMP 05/29/2025   SpO2 96%   BMI 37.76 kg/m²   Patient's last menstrual period was 05/29/2025.       Physical Exam     Physical Exam  Vitals and nursing note reviewed. Exam conducted with a chaperone present.   Constitutional:       General: She is not in acute distress.     Appearance: Normal appearance.   HENT:      Head: Normocephalic and atraumatic.     Musculoskeletal:      Comments: Right knee:  No ecchymosis, no erythema  ROM:  Flexion " 130  Extension: 0  + retropatellar crepitus throughout ROM  Tender over medial joint line, mild lateral joint line  Equivocal medial Kole's   Neg Lachman  No instability to varus/valgus stress  Right lower leg:  Calf supple, non-tender   Neg Jorge's     Skin:     General: Skin is warm and dry.     Neurological:      Mental Status: She is alert and oriented to person, place, and time.     Psychiatric:         Mood and Affect: Mood normal.         Behavior: Behavior normal.       Xray: 3 view right knee:  Preliminary reading: medial and lateral joint spaces are maintained.  Narrowing of patellofemoral joint space with osteophyte formation present on lateral patella  and lateral femoral condyle.  No acute fracture.  Await final reading.                  [1]   Current Outpatient Medications:     acetaminophen (TYLENOL) 650 mg CR tablet, Take 1 tablet (650 mg total) by mouth every 8 (eight) hours as needed for mild pain, Disp: 30 tablet, Rfl: 0    albuterol (PROVENTIL HFA,VENTOLIN HFA) 90 mcg/act inhaler, INHALE 2 PUFFS BY MOUTH EVERY 6 HOURS AS NEEDED FOR WHEEZE, Disp: 6.7 g, Rfl: 0    azelastine (OPTIVAR) 0.05 % ophthalmic solution, INSTILL 1 DROP INTO BOTH EYES TWICE A DAY, Disp: 18 mL, Rfl: 1    Cholecalciferol (Vitamin D-3) 125 MCG (5000 UT) TABS, Take 1 tablet by mouth in the morning, Disp: 90 tablet, Rfl: 0    ferrous sulfate 325 (65 FE) MG EC tablet, TAKE 1 TABLET BY MOUTH EVERY DAY WITH BREAKFAST, Disp: 90 tablet, Rfl: 2    fluticasone (FLONASE) 50 mcg/act nasal spray, 2 sprays into each nostril daily, Disp: 48 mL, Rfl: 1    folic acid (FOLVITE) 1 mg tablet, TAKE 1 TABLET BY MOUTH EVERY DAY, Disp: 90 tablet, Rfl: 1    miSOPROStol (Cytotec) 200 mcg tablet, Place in vagina the night before procedure (biopsy), Disp: 1 tablet, Rfl: 0    Multiple Vitamin (multivitamin) capsule, Take 1 capsule by mouth daily, Disp: , Rfl:     omeprazole (PriLOSEC) 20 mg delayed release capsule, TAKE 1 CAPSULE BY MOUTH EVERY DAY  (INSURANCE WANTS MAIL ORDER AFTER 2 FILLS), Disp: 90 capsule, Rfl: 1    polyethylene glycol-electrolytes (NULYTELY) 4000 mL solution, Take 4,000 mL by mouth once for 1 dose, Disp: 4000 mL, Rfl: 0    Slynd 4 MG TABS, TAKE 1 TABLET BY MOUTH EVERY DAY IN THE MORNING, Disp: 28 tablet, Rfl: 1    triamcinolone (KENALOG) 0.1 % cream, Apply 1 Application topically 2 (two) times a day To affected area, Disp: 30 g, Rfl: 1    vitamin B-12 (CYANOCOBALAMIN) 50 MCG TABS, Take 1 tablet (50 mcg total) by mouth daily, Disp: , Rfl:   [2]   Past Medical History:  Diagnosis Date    Allergic     Anemia     Asthma     Genital warts 2003    Went i was pregnan    GERD (gastroesophageal reflux disease) 3yr    Lactose intolerance 10yr    Migraine 1999   [3]   Past Surgical History:  Procedure Laterality Date    ABDOMINAL SURGERY  2009    [place]    ANKLE ARTHROSCOPY      BARIATRIC SURGERY  2009    [place]    GASTRIC BYPASS LAPAROSCOPIC     [4]   Family History  Problem Relation Name Age of Onset    Diabetes Mother Camryn farida     Thyroid disease Mother Camryn iqbal     No Known Problems Sister      No Known Problems Daughter      Breast cancer Maternal Grandmother Florencia     Colon cancer Maternal Grandmother Florencia     No Known Problems Maternal Grandfather      No Known Problems Paternal Grandmother      No Known Problems Paternal Grandfather      No Known Problems Maternal Aunt      No Known Problems Maternal Aunt      No Known Problems Maternal Aunt      No Known Problems Maternal Aunt      No Known Problems Maternal Aunt      No Known Problems Maternal Aunt      No Known Problems Maternal Aunt

## 2025-06-04 NOTE — PATIENT INSTRUCTIONS
Take Tylenol for pain per bottle direction  Try Aspercreme or voltaren gel for pain  Ice the knee  Follow up with orthopedics   Follow up with PCP in 3-5 days.  Proceed to  ER if symptoms worsen.    If tests have been performed at [facility], our office will contact you with results if changes need to be made to the care plan discussed with you at the visit.  You can review your full results on [facility] MyChart.

## 2025-06-05 ENCOUNTER — RESULTS FOLLOW-UP (OUTPATIENT)
Dept: FAMILY MEDICINE CLINIC | Facility: CLINIC | Age: 45
End: 2025-06-05

## 2025-06-05 NOTE — TELEPHONE ENCOUNTER
----- Message from Kelly Bernabe MD sent at 6/5/2025  1:26 PM EDT -----  Please schedule follow-up on ultrasound result  ----- Message -----  From: Interface, Radiology Results In  Sent: 6/5/2025  11:46 AM EDT  To: Kelly Bernabe MD

## 2025-06-07 ENCOUNTER — APPOINTMENT (EMERGENCY)
Dept: RADIOLOGY | Facility: HOSPITAL | Age: 45
End: 2025-06-07
Payer: COMMERCIAL

## 2025-06-07 ENCOUNTER — HOSPITAL ENCOUNTER (EMERGENCY)
Facility: HOSPITAL | Age: 45
Discharge: HOME | End: 2025-06-07
Attending: EMERGENCY MEDICINE | Admitting: EMERGENCY MEDICINE
Payer: COMMERCIAL

## 2025-06-07 VITALS
OXYGEN SATURATION: 100 % | RESPIRATION RATE: 18 BRPM | HEART RATE: 80 BPM | DIASTOLIC BLOOD PRESSURE: 85 MMHG | SYSTOLIC BLOOD PRESSURE: 132 MMHG | TEMPERATURE: 99.3 F

## 2025-06-07 DIAGNOSIS — M25.561 RIGHT KNEE PAIN, UNSPECIFIED CHRONICITY: Primary | ICD-10-CM

## 2025-06-07 PROCEDURE — 99284 EMERGENCY DEPT VISIT MOD MDM: CPT | Performed by: PHYSICIAN ASSISTANT

## 2025-06-07 PROCEDURE — 96372 THER/PROPH/DIAG INJ SC/IM: CPT

## 2025-06-07 PROCEDURE — 99283 EMERGENCY DEPT VISIT LOW MDM: CPT

## 2025-06-07 PROCEDURE — 73564 X-RAY EXAM KNEE 4 OR MORE: CPT

## 2025-06-07 RX ORDER — ACETAMINOPHEN 500 MG
500 TABLET ORAL EVERY 6 HOURS PRN
Qty: 60 TABLET | Refills: 0 | Status: SHIPPED | OUTPATIENT
Start: 2025-06-07

## 2025-06-07 RX ORDER — KETOROLAC TROMETHAMINE 30 MG/ML
15 INJECTION, SOLUTION INTRAMUSCULAR; INTRAVENOUS ONCE
Status: COMPLETED | OUTPATIENT
Start: 2025-06-07 | End: 2025-06-07

## 2025-06-07 RX ADMIN — KETOROLAC TROMETHAMINE 15 MG: 30 INJECTION, SOLUTION INTRAMUSCULAR at 10:35

## 2025-06-07 NOTE — DISCHARGE INSTRUCTIONS
Please return to the emergency department for worsening symptoms including chest pain, shortness of breath, dizziness, lightheadedness, fever greater than 103, severe pain, inability to walk, fainting episodes, etc..  Please follow-up with your family practice provider as soon as possible.  I have sent medications over to the pharmacy for your symptoms.  Please take as directed.  Keep the brace in place.  This will help give your knee some stability until you see your orthopedist on Tuesday.

## 2025-06-08 NOTE — ED PROVIDER NOTES
Time reflects when diagnosis was documented in both MDM as applicable and the Disposition within this note       Time User Action Codes Description Comment    6/7/2025 11:12 AM Dayton Jones Add [M25.561] Right knee pain, unspecified chronicity           ED Disposition       ED Disposition   Discharge    Condition   Stable    Date/Time   Sat Jun 7, 2025 11:12 AM    Comment   Narda Liconar discharge to home/self care.                   Assessment & Plan       Medical Decision Making  45-year-old female presenting to the emergency department today for right knee pain.  This has been an ongoing problem but the patient feels as though she hyperextended her knee yesterday and now it is more painful.  She is still able to bear weight on the knee.  Vitals are stable.  Afebrile.  On physical examination, the patient is still able to flex and extend the right knee joint.  No evidence of septic arthritis.  Anterior tenderness to palpation to the right knee joint.  Differential diagnosis includes but is not limited to ligamentous strain, tibial plateau fracture, meniscus injury, etc.  X-ray of the right knee is without acute osseous abnormalities on my independent interpretation.  Patient was dosed with Toradol while here in the emergency department and given a hinged knee brace for comfort.  She is stable for discharge at this time.  Tylenol sent to the patient's pharmacy.  Follow-up with orthopedics.  Patient already has a referral.  Return to the emergency department for worsening symptoms.  Strict return precautions were given.  Recommend PCP follow-up as soon as possible. The patient and/or patient's proxy verify their understanding and agree to the plan at this time.  All questions answered to the patient and/or their proxy's satisfaction.  All labs reviewed and utilized in the medical decision making process (if labs were ordered).  Portions of the record may have been created with voice recognition software.  " Occasional wrong word or \"sound a like\" substitutions may have occurred due to the inherent limitations of voice recognition software.  Read the chart carefully and recognize, using context, where substitutions have occurred.    I reviewed prior notes.  Case discussed with daughter at bedside.    Problems Addressed:  Right knee pain, unspecified chronicity: undiagnosed new problem with uncertain prognosis    Amount and/or Complexity of Data Reviewed  Independent Historian: caregiver  External Data Reviewed: notes.  Radiology: ordered and independent interpretation performed. Decision-making details documented in ED Course.     Details: XR Right Knee    Risk  OTC drugs.  Prescription drug management.             Medications   ketorolac (TORADOL) injection 15 mg (15 mg Intramuscular Given 6/7/25 1035)       ED Risk Strat Scores                    No data recorded                            History of Present Illness       Chief Complaint   Patient presents with    Knee Pain     Patient arrives to the Ed with complaint of worsened right knee pain. She states that her pain worsened after she was ambulating and she felt like her knee hyperextended yesterday.        Past Medical History[1]   Past Surgical History[2]   Family History[3]   Social History[4]   E-Cigarette/Vaping    E-Cigarette Use Never User       E-Cigarette/Vaping Substances      I have reviewed and agree with the history as documented.     This is a 45-year-old female presenting to the emergency department today for right knee pain.  The patient notes that she was recently evaluated for similar pain but more recently she had an episode where she stumbled and she felt as though her knee hyperextended.  She now notes pain to the anterior aspect of the right knee but denies any swelling.  She has an upcoming appointment with an orthopedist.  She has no numbness or tingling.  She has no knee swelling.  She has no fever or chills.  She has no chest pain or " shortness of breath.  She has no pain in the hip or in the foot.  The patient denies other complaints at this time.      History provided by:  Patient   used: No    Knee Pain  Location:  Knee  Time since incident:  1 day  Injury: yes    Knee location:  R knee  Chronicity:  New  Dislocation: no    Tetanus status:  Unknown  Prior injury to area:  Yes  Relieved by:  Nothing  Worsened by:  Bearing weight  Ineffective treatments:  None tried  Associated symptoms: decreased ROM (2/2 to pain) and stiffness    Associated symptoms: no back pain, no fatigue, no fever, no itching, no muscle weakness, no neck pain, no numbness, no swelling and no tingling    Risk factors: obesity        Review of Systems   Constitutional:  Negative for appetite change, chills, diaphoresis, fatigue and fever.   Eyes:  Negative for visual disturbance.   Respiratory:  Negative for cough, chest tightness, shortness of breath and wheezing.    Cardiovascular:  Negative for chest pain, palpitations and leg swelling.   Gastrointestinal:  Negative for abdominal pain, constipation, diarrhea, nausea and vomiting.   Musculoskeletal:  Positive for stiffness. Negative for back pain, neck pain and neck stiffness.   Skin:  Negative for itching, rash and wound.   Neurological:  Negative for dizziness, seizures, syncope, weakness, light-headedness, numbness and headaches.   Psychiatric/Behavioral:  Negative for confusion.    All other systems reviewed and are negative.          Objective       ED Triage Vitals   Temperature Pulse Blood Pressure Respirations SpO2 Patient Position - Orthostatic VS   06/07/25 1016 06/07/25 1016 06/07/25 1016 06/07/25 1016 06/07/25 1016 06/07/25 1016   99.3 °F (37.4 °C) 80 132/85 18 100 % Sitting      Temp Source Heart Rate Source BP Location FiO2 (%) Pain Score    06/07/25 1016 06/07/25 1016 06/07/25 1016 -- 06/07/25 1035    Oral Monitor Left arm  8      Vitals      Date and Time Temp Pulse SpO2 Resp BP Pain  Score FACES Pain Rating User   06/07/25 1035 -- -- -- -- -- 8 -- GABRIELLE   06/07/25 1016 99.3 °F (37.4 °C) 80 100 % 18 132/85 -- -- PH            Physical Exam  Vitals and nursing note reviewed.   Constitutional:       General: She is not in acute distress.     Appearance: Normal appearance. She is obese. She is not ill-appearing, toxic-appearing or diaphoretic.   HENT:      Head: Normocephalic and atraumatic.      Nose: Nose normal. No congestion or rhinorrhea.      Mouth/Throat:      Mouth: Mucous membranes are moist.      Pharynx: No oropharyngeal exudate or posterior oropharyngeal erythema.     Eyes:      General: No scleral icterus.        Right eye: No discharge.         Left eye: No discharge.      Extraocular Movements: Extraocular movements intact.      Pupils: Pupils are equal, round, and reactive to light.       Cardiovascular:      Rate and Rhythm: Normal rate and regular rhythm.      Pulses: Normal pulses.      Heart sounds: Normal heart sounds. No murmur heard.     No friction rub. No gallop.   Pulmonary:      Effort: Pulmonary effort is normal. No respiratory distress.      Breath sounds: Normal breath sounds. No stridor. No wheezing, rhonchi or rales.   Chest:      Chest wall: No tenderness.     Musculoskeletal:         General: Normal range of motion.      Cervical back: Normal range of motion. No tenderness.      Right lower leg: No edema.      Left lower leg: No edema.      Comments: Tenderness to palpation to the anterior aspect of the right knee.  She is still able to flex and extend at the right knee though with some pain.  There is no redness, warmth, or swelling to the right knee to suspect septic joint.     Skin:     General: Skin is warm and dry.      Capillary Refill: Capillary refill takes less than 2 seconds.      Coloration: Skin is not jaundiced or pale.     Neurological:      General: No focal deficit present.      Mental Status: She is alert and oriented to person, place, and time. Mental  status is at baseline.     Psychiatric:         Mood and Affect: Mood normal.         Behavior: Behavior normal.         Results Reviewed       None            XR knee 4+ views Right injury   Final Interpretation by Gerson Gaitan MD ( 1103)      No acute osseous abnormality.         Computerized Assisted Algorithm (CAA) may have been used to analyze all applicable images.         Workstation performed: [id number]             Procedures    ED Medication and Procedure Management   Prior to Admission Medications   Prescriptions Last Dose Informant Patient Reported? Taking?   Cholecalciferol (Vitamin D-3) 125 MCG (5000 UT) TABS  Self No No   Sig: Take 1 tablet by mouth in the morning   Multiple Vitamin (multivitamin) capsule  Self Yes No   Sig: Take 1 capsule by mouth daily   Slynd 4 MG TABS   No No   Sig: TAKE 1 TABLET BY MOUTH EVERY DAY IN THE MORNING   acetaminophen (TYLENOL) 650 mg CR tablet  Self No No   Sig: Take 1 tablet (650 mg total) by mouth every 8 (eight) hours as needed for mild pain   albuterol (PROVENTIL HFA,VENTOLIN HFA) 90 mcg/act inhaler  Self No No   Sig: INHALE 2 PUFFS BY MOUTH EVERY 6 HOURS AS NEEDED FOR WHEEZE   azelastine (OPTIVAR) 0.05 % ophthalmic solution  Self No No   Sig: INSTILL 1 DROP INTO BOTH EYES TWICE A DAY   ferrous sulfate 325 (65 FE) MG EC tablet   No No   Sig: TAKE 1 TABLET BY MOUTH EVERY DAY WITH BREAKFAST   fluticasone (FLONASE) 50 mcg/act nasal spray   No No   Si sprays into each nostril daily   folic acid (FOLVITE) 1 mg tablet  Self No No   Sig: TAKE 1 TABLET BY MOUTH EVERY DAY   miSOPROStol (Cytotec) 200 mcg tablet   No No   Sig: Place in vagina the night before procedure (biopsy)   omeprazole (PriLOSEC) 20 mg delayed release capsule  Self No No   Sig: TAKE 1 CAPSULE BY MOUTH EVERY DAY (INSURANCE WANTS MAIL ORDER AFTER 2 FILLS)   polyethylene glycol-electrolytes (NULYTELY) 4000 mL solution   No No   Sig: Take 4,000 mL by mouth once for 1 dose   triamcinolone (KENALOG)  0.1 % cream   No No   Sig: Apply 1 Application topically 2 (two) times a day To affected area   vitamin B-12 (CYANOCOBALAMIN) 50 MCG TABS   No No   Sig: Take 1 tablet (50 mcg total) by mouth daily      Facility-Administered Medications: None     Discharge Medication List as of 6/7/2025 11:13 AM        START taking these medications    Details   acetaminophen (TYLENOL) 500 mg tablet Take 1 tablet (500 mg total) by mouth every 6 (six) hours as needed for mild pain, Starting Sat 6/7/2025, Normal           CONTINUE these medications which have NOT CHANGED    Details   albuterol (PROVENTIL HFA,VENTOLIN HFA) 90 mcg/act inhaler INHALE 2 PUFFS BY MOUTH EVERY 6 HOURS AS NEEDED FOR WHEEZE, Normal      azelastine (OPTIVAR) 0.05 % ophthalmic solution INSTILL 1 DROP INTO BOTH EYES TWICE A DAY, Normal      Cholecalciferol (Vitamin D-3) 125 MCG (5000 UT) TABS Take 1 tablet by mouth in the morning, Starting Tue 5/16/2023, Normal      ferrous sulfate 325 (65 FE) MG EC tablet TAKE 1 TABLET BY MOUTH EVERY DAY WITH BREAKFAST, Starting Thu 12/12/2024, Normal      fluticasone (FLONASE) 50 mcg/act nasal spray 2 sprays into each nostril daily, Starting Thu 10/10/2024, Normal      folic acid (FOLVITE) 1 mg tablet TAKE 1 TABLET BY MOUTH EVERY DAY, Starting Mon 10/2/2023, Normal      miSOPROStol (Cytotec) 200 mcg tablet Place in vagina the night before procedure (biopsy), Normal      Multiple Vitamin (multivitamin) capsule Take 1 capsule by mouth daily, Historical Med      omeprazole (PriLOSEC) 20 mg delayed release capsule TAKE 1 CAPSULE BY MOUTH EVERY DAY (INSURANCE WANTS MAIL ORDER AFTER 2 FILLS), Normal      polyethylene glycol-electrolytes (NULYTELY) 4000 mL solution Take 4,000 mL by mouth once for 1 dose, Starting Fri 6/7/2024, Normal      Slynd 4 MG TABS TAKE 1 TABLET BY MOUTH EVERY DAY IN THE MORNING, Starting Fri 1/17/2025, Normal      triamcinolone (KENALOG) 0.1 % cream Apply 1 Application topically 2 (two) times a day To affected  area, Starting Thu 10/10/2024, Normal      vitamin B-12 (CYANOCOBALAMIN) 50 MCG TABS Take 1 tablet (50 mcg total) by mouth daily, Starting Thu 4/17/2025, No Print           STOP taking these medications       acetaminophen (TYLENOL) 650 mg CR tablet Comments:   Reason for Stopping:             No discharge procedures on file.  ED SEPSIS DOCUMENTATION   Time reflects when diagnosis was documented in both MDM as applicable and the Disposition within this note       Time User Action Codes Description Comment    6/7/2025 11:12 AM Dayton Jones Add [M25.561] Right knee pain, unspecified chronicity                    [1]   Past Medical History:  Diagnosis Date    Allergic     Anemia     Asthma     Genital warts 2003    Went i was pregnan    GERD (gastroesophageal reflux disease) 3yr    Lactose intolerance 10yr    Migraine 1999   [2]   Past Surgical History:  Procedure Laterality Date    ABDOMINAL SURGERY  2009    [place]    ANKLE ARTHROSCOPY      BARIATRIC SURGERY  2009    [place]    GASTRIC BYPASS LAPAROSCOPIC     [3]   Family History  Problem Relation Name Age of Onset    Diabetes Mother Camryn iqbal     Thyroid disease Mother Camryn iqbal     No Known Problems Sister      No Known Problems Daughter      Breast cancer Maternal Grandmother Florencia     Colon cancer Maternal Grandmother Florencia     No Known Problems Maternal Grandfather      No Known Problems Paternal Grandmother      No Known Problems Paternal Grandfather      No Known Problems Maternal Aunt      No Known Problems Maternal Aunt      No Known Problems Maternal Aunt      No Known Problems Maternal Aunt      No Known Problems Maternal Aunt      No Known Problems Maternal Aunt      No Known Problems Maternal Aunt     [4]   Social History  Tobacco Use    Smoking status: Never    Smokeless tobacco: Never   Vaping Use    Vaping status: Never Used   Substance Use Topics    Alcohol use: Yes     Comment: Social    Drug use: Never         Dayton Jones PA-C  06/08/25 [id number]

## 2025-06-09 ENCOUNTER — RESULTS FOLLOW-UP (OUTPATIENT)
Age: 45
End: 2025-06-09

## 2025-06-10 ENCOUNTER — OFFICE VISIT (OUTPATIENT)
Dept: OBGYN CLINIC | Facility: CLINIC | Age: 45
End: 2025-06-10
Payer: COMMERCIAL

## 2025-06-10 VITALS — BODY MASS INDEX: 37.32 KG/M2 | HEIGHT: 64 IN | WEIGHT: 218.6 LBS

## 2025-06-10 DIAGNOSIS — M17.11 PRIMARY OSTEOARTHRITIS OF RIGHT KNEE: Primary | ICD-10-CM

## 2025-06-10 PROCEDURE — 99204 OFFICE O/P NEW MOD 45 MIN: CPT | Performed by: STUDENT IN AN ORGANIZED HEALTH CARE EDUCATION/TRAINING PROGRAM

## 2025-06-10 PROCEDURE — 20610 DRAIN/INJ JOINT/BURSA W/O US: CPT | Performed by: STUDENT IN AN ORGANIZED HEALTH CARE EDUCATION/TRAINING PROGRAM

## 2025-06-10 RX ORDER — METHYLPREDNISOLONE ACETATE 40 MG/ML
2 INJECTION, SUSPENSION INTRA-ARTICULAR; INTRALESIONAL; INTRAMUSCULAR; SOFT TISSUE
Status: COMPLETED | OUTPATIENT
Start: 2025-06-10 | End: 2025-06-10

## 2025-06-10 RX ORDER — BUPIVACAINE HYDROCHLORIDE 2.5 MG/ML
2 INJECTION, SOLUTION INFILTRATION; PERINEURAL
Status: COMPLETED | OUTPATIENT
Start: 2025-06-10 | End: 2025-06-10

## 2025-06-10 RX ORDER — ROPIVACAINE HYDROCHLORIDE 2 MG/ML
2 INJECTION, SOLUTION EPIDURAL; INFILTRATION; PERINEURAL
Status: COMPLETED | OUTPATIENT
Start: 2025-06-10 | End: 2025-06-10

## 2025-06-10 RX ADMIN — ROPIVACAINE HYDROCHLORIDE 2 ML: 2 INJECTION, SOLUTION EPIDURAL; INFILTRATION; PERINEURAL at 14:30

## 2025-06-10 RX ADMIN — METHYLPREDNISOLONE ACETATE 2 ML: 40 INJECTION, SUSPENSION INTRA-ARTICULAR; INTRALESIONAL; INTRAMUSCULAR; SOFT TISSUE at 14:30

## 2025-06-10 RX ADMIN — BUPIVACAINE HYDROCHLORIDE 2 ML: 2.5 INJECTION, SOLUTION INFILTRATION; PERINEURAL at 14:30

## 2025-06-10 NOTE — ASSESSMENT & PLAN NOTE
We discussed beneficial impact of weight loss and extensive nutrition counseling was provided patient today.  We discussed beneficial impact of weight loss on minimizing pain and symptoms as well as possibly progression of arthritis.

## 2025-06-10 NOTE — PROGRESS NOTES
Initial Office Visit    Assessment:   Narda Cook is a 45 y.o. female with medial and patellofemoral compartment right knee osteoarthritis. Also with BMI = 37.52    Plan:  Assessment & Plan  Primary osteoarthritis of right knee  Discussed physical exam and imaging findings of the right knee at length with patient in the office today.  Discussed findings consistent with osteoarthritis of the medial and patellofemoral compartment as well as patellofemoral syndrome.  Secondary to findings recommend conservative management with over-the-counter pain medications, physical therapy, activity modification, and intermittent steroid injections as needed for persistent pain and inflammation.  Discussed steroid injection in the office today.  Discussed side effects and risks of injection.  Patient expressed understanding.  Patient agreeable.  In sterile fashion, an injection of 2 cc of ropivacaine, 2 cc of bupivacaine, and 2 cc of Depo was administered via the anterior lateral portal into the right knee.  Patient tolerated procedure well without complications.  Also discussed physical therapy specifically to work on VMO strengthening to help stabilize and support the right knee specifically the patella.  Patient agreeable and referral placed in the office today.  Patient also provided with a packet of home exercises as she is leaving for [place] shortly.  Discussed with patient can also consider oral steroid if persistent pain and inflammation despite steroid injection secondary to trip to [place] for 1 month and inability to take NSAIDs secondary to history of gastric bypass.  Discussed side effects and risk of oral steroid.  Patient will contact office if she desires a Medrol Dosepak.  She will follow-up after completing 6 weeks of PT once returning from [place], persistent pain and inflammation at this time can consider MRI.  All of her questions answered in the office today.  Orders:    Ambulatory Referral  "to Physical Therapy; Future    Large joint arthrocentesis: R knee    BMI 39.0-39.9,adult  We discussed beneficial impact of weight loss and extensive nutrition counseling was provided patient today.  We discussed beneficial impact of weight loss on minimizing pain and symptoms as well as possibly progression of arthritis.         Chief Complaint: rightknee pain, swelling, limited motion     History of Present Illness:   Narda Cook is a 45 y.o. female who presents to the office for evaluation of her right knees.  She is referred by Chapis Humphries for consultation of her right knee.  Patient notes anterior medial right knee pain for the last couple of weeks increased with long periods of activity or after periods of rest.  She notes pain is located in the medial aspect of her right knee and describes it as sharp and aching in nature.  She previously went to the emergency room for evaluation and was given a hinged knee brace and scheduled for outpatient orthopedic follow-up secondary to underlying osteoarthritis.  Upon evaluation in the office today, patient notes persistent pain over the medial aspect of her right knee.  She also notes sensation of \"popping\" over the anterior aspect of her right knee.  She notes use of topical medications as well as Tylenol with moderate relief of pain.  She denies previous surgery, injury, or injections.    Pain Assessment:  Character: Aching and sharp  Location: Right knee  Duration: 2 weeks  Intensity: 6/10   Associated Symptoms:  Knee swelling and instability    PMHx: Past Medical History[1]  PSHx: Past Surgical History[2]  Medications: Medications Ordered Prior to Encounter[3]  Allergies: Allergies[4]  Family History: Family History[5]  Review of systems: ROS is negative other than that noted in the HPI.  Constitutional: Negative for fatigue and fever.   HENT: Negative for sore throat.    Respiratory: Negative for shortness of breath.    Cardiovascular: Negative for chest " pain.   Gastrointestinal: Negative for abdominal pain.   Endocrine: Negative for cold intolerance and heat intolerance.   Genitourinary: Negative for flank pain.   Musculoskeletal: Negative for back pain.   Skin: Negative for rash.   Allergic/Immunologic: Negative for immunocompromised state.   Neurological: Negative for dizziness.   Psychiatric/Behavioral: Negative for agitation.     Comprehensive Physical Examination:  There were no vitals filed for this visit.    General Appearance: The patient is a well developed, well nourished female  in no apparent distress.  Orientation:  The patient is alert and interactive.  Oriented to time, place and person.  Mood and Affect:  The patient has normal mood and affect.  Gait and Station: she is noted to ambulate with a mildly antalgic gait favoring the left lower extremity.  Observed standing alignment demonstrates varus alignment of right knee present.      Knee Exam (focused):                RIGHT LEFT   ROM:   0-130 0-130   Crepitus  Positive Negative   Palpation: Effusion negative negative     MJL tenderness Positive Negative     LJL tenderness Negative Negative   Meniscus: Kole Negative Negative    Apley's Compression Negative Negative   Instability: Varus at 0 + 30 stable stable     Valgus at 0 + 30 stable stable   Special Tests: Lachman Negative Negative     Posterior drawer Negative Negative     Anterior drawer Negative Negative     Pivot shift not tested not tested     Dial not tested not tested   Patella: Grind test Positive Negative     Mobility 1/4 1/4     Apprehension Negative Negative   Other: Single leg 1/4 squat not tested not tested      LE NV Exam: +2 DP/PT pulses bilaterally  Sensation intact to light touch L2-S1 bilaterally     Bilateral hip ROM demonstrates no pain actively or passively    No calf tenderness to palpation bilaterally      Radiographic Imaging:   I have personally reviewed and interpreted 4 radiographs of the right knee which were  completed in the emergency room on 06/07/2025 and reviewed at length with patient in detail in the office today.  X-ray demonstrates mild to moderate osteoarthritis of the medial tibiofemoral and patellofemoral compartment.  No acute osseous abnormality.  No acute fracture or dislocation.    Large joint arthrocentesis: R knee    Performed by: Tee Ramirez MD  Authorized by: Tee Ramirez MD    Universal Protocol:  Consent: Verbal consent obtained  Risks and benefits: risks, benefits and alternatives were discussed  Consent given by: patient  Patient understanding: patient states understanding of the procedure being performed  Patient identity confirmed: verbally with patient  Supporting Documentation  Indications: pain     Is this a Visco injection? NoProcedure Details  Location: knee - R knee  Preparation: Patient was prepped and draped in the usual sterile fashion  Needle size: 22 G  Ultrasound guidance: no  Approach: anterolateral  Medications administered: 2 mL bupivacaine 0.25 %; 2 mL methylPREDNISolone acetate 40 mg/mL; 2 mL ropivacaine 0.2 %    Patient tolerance: patient tolerated the procedure well with no immediate complications  Dressing:  Sterile dressing applied                 [1]   Past Medical History:  Diagnosis Date    Allergic     Anemia     Asthma     Genital warts 2003    Went i was pregnan    GERD (gastroesophageal reflux disease) 3yr    Lactose intolerance 10yr    Migraine 1999   [2]   Past Surgical History:  Procedure Laterality Date    ABDOMINAL SURGERY  2009    [place]    ANKLE ARTHROSCOPY      BARIATRIC SURGERY  2009    [place]    GASTRIC BYPASS LAPAROSCOPIC     [3]   Current Outpatient Medications on File Prior to Visit   Medication Sig Dispense Refill    acetaminophen (TYLENOL) 500 mg tablet Take 1 tablet (500 mg total) by mouth every 6 (six) hours as needed for mild pain 60 tablet 0    albuterol (PROVENTIL HFA,VENTOLIN HFA) 90 mcg/act inhaler INHALE 2 PUFFS BY  MOUTH EVERY 6 HOURS AS NEEDED FOR WHEEZE 6.7 g 0    azelastine (OPTIVAR) 0.05 % ophthalmic solution INSTILL 1 DROP INTO BOTH EYES TWICE A DAY 18 mL 1    Cholecalciferol (Vitamin D-3) 125 MCG (5000 UT) TABS Take 1 tablet by mouth in the morning 90 tablet 0    ferrous sulfate 325 (65 FE) MG EC tablet TAKE 1 TABLET BY MOUTH EVERY DAY WITH BREAKFAST 90 tablet 2    fluticasone (FLONASE) 50 mcg/act nasal spray 2 sprays into each nostril daily 48 mL 1    folic acid (FOLVITE) 1 mg tablet TAKE 1 TABLET BY MOUTH EVERY DAY 90 tablet 1    miSOPROStol (Cytotec) 200 mcg tablet Place in vagina the night before procedure (biopsy) 1 tablet 0    Multiple Vitamin (multivitamin) capsule Take 1 capsule by mouth in the morning.      omeprazole (PriLOSEC) 20 mg delayed release capsule TAKE 1 CAPSULE BY MOUTH EVERY DAY (INSURANCE WANTS MAIL ORDER AFTER 2 FILLS) 90 capsule 1    triamcinolone (KENALOG) 0.1 % cream Apply 1 Application topically 2 (two) times a day To affected area 30 g 1    vitamin B-12 (CYANOCOBALAMIN) 50 MCG TABS Take 1 tablet (50 mcg total) by mouth daily      polyethylene glycol-electrolytes (NULYTELY) 4000 mL solution Take 4,000 mL by mouth once for 1 dose 4000 mL 0    Slynd 4 MG TABS TAKE 1 TABLET BY MOUTH EVERY DAY IN THE MORNING 28 tablet 1     No current facility-administered medications on file prior to visit.   [4]   Allergies  Allergen Reactions    Shellfish Allergy - Food Allergy Dermatitis, Itching, Palpitations, Rash, Shortness Of Breath, Swelling, Throat Swelling and Vomiting    Contrast [Iodinated Contrast Media] Hives   [5]   Family History  Problem Relation Name Age of Onset    Diabetes Mother Camryn iqbal     Thyroid disease Mother Camryn iqbal     No Known Problems Sister      No Known Problems Daughter      Breast cancer Maternal Grandmother Florencia     Colon cancer Maternal Grandmother Florencia     No Known Problems Maternal Grandfather      No Known Problems Paternal Grandmother      No Known Problems  Paternal Grandfather      No Known Problems Maternal Aunt      No Known Problems Maternal Aunt      No Known Problems Maternal Aunt      No Known Problems Maternal Aunt      No Known Problems Maternal Aunt      No Known Problems Maternal Aunt      No Known Problems Maternal Aunt

## 2025-06-10 NOTE — ASSESSMENT & PLAN NOTE
Discussed physical exam and imaging findings of the right knee at length with patient in the office today.  Discussed findings consistent with osteoarthritis of the medial and patellofemoral compartment as well as patellofemoral syndrome.  Secondary to findings recommend conservative management with over-the-counter pain medications, physical therapy, activity modification, and intermittent steroid injections as needed for persistent pain and inflammation.  Discussed steroid injection in the office today.  Discussed side effects and risks of injection.  Patient expressed understanding.  Patient agreeable.  In sterile fashion, an injection of 2 cc of ropivacaine, 2 cc of bupivacaine, and 2 cc of Depo was administered via the anterior lateral portal into the right knee.  Patient tolerated procedure well without complications.  Also discussed physical therapy specifically to work on VMO strengthening to help stabilize and support the right knee specifically the patella.  Patient agreeable and referral placed in the office today.  Patient also provided with a packet of home exercises as she is leaving for [place] shortly.  Discussed with patient can also consider oral steroid if persistent pain and inflammation despite steroid injection secondary to trip to [place] for 1 month and inability to take NSAIDs secondary to history of gastric bypass.  Discussed side effects and risk of oral steroid.  Patient will contact office if she desires a Medrol Dosepak.  She will follow-up after completing 6 weeks of PT once returning from [place], persistent pain and inflammation at this time can consider MRI.  All of her questions answered in the office today.  Orders:    Ambulatory Referral to Physical Therapy; Future    Large joint arthrocentesis: R knee

## 2025-07-17 ENCOUNTER — EVALUATION (OUTPATIENT)
Dept: PHYSICAL THERAPY | Facility: CLINIC | Age: 45
End: 2025-07-17
Payer: COMMERCIAL

## 2025-07-17 DIAGNOSIS — M17.11 PRIMARY OSTEOARTHRITIS OF RIGHT KNEE: ICD-10-CM

## 2025-07-17 PROCEDURE — 97140 MANUAL THERAPY 1/> REGIONS: CPT

## 2025-07-17 PROCEDURE — 97162 PT EVAL MOD COMPLEX 30 MIN: CPT

## 2025-07-17 NOTE — PROGRESS NOTES
PT Evaluation     Today's date: 2025  Patient name: Narda Cook  : 1980  MRN: [id number]  Referring provider: Tee Ramirez MD  Dx:   Encounter Diagnosis     ICD-10-CM    1. Primary osteoarthritis of right knee  M17.11 Ambulatory Referral to Physical Therapy                     Assessment  Impairments: abnormal muscle firing, abnormal muscle tone, abnormal or restricted ROM, abnormal movement, impaired physical strength, lacks appropriate home exercise program, pain with function and poor posture   Symptom irritability: low    Assessment details: Narda Cook is a pleasant 45 y.o. female who presents with right knee pain.  The patient's greatest concerns are worry over not knowing what's wrong and fear of not being able to keep active.      No further referral appears necessary at this time based upon examination results.    Primary movement impairment diagnosis of patellofemoral pain extension based syndrome resulting in pathoanatomical symptoms of Primary osteoarthritis of right knee and limiting her ability to exercise or recreation, go to work, and sit.    Impairments include:  1) hamstring length  2) hamstring strength  3) knee mobility    Etiologic factors include starting new job as a  where she denote a lot of newly introduced sustained knee flexion..    Discussed risks, benefits, and alternatives to treatment, and answered all patient questions to patient satisfaction.  Understanding of Dx/Px/POC: good     Prognosis: good    Goals  Impairment Goals 4-6 weeks  - Decrease pain to 2/10  - Improve knee AROM to equal to the unaffected lower extremity  - Increase knee strength to 5/5 throughout  - Increase hip strength to 5/5 throughout    Functional Goals 6-8 weeks  - Return to Prior Level of Function  - Patient will be independent with HEP  - Patient will be able to squat without increased pain/compensation/difficulty  - Patient will be able to perform sit to stand without  increased pain/compensation/difficulty   - Patient will be able to ascend and descend stairs without increased pain/compensation/difficulty   - Patient will be able to lift >20 pounds with proper mechanics      Plan  Patient would benefit from: skilled physical therapy  Planned modality interventions: cryotherapy, TENS, thermotherapy: hydrocollator packs and unattended electrical stimulation    Planned therapy interventions: abdominal trunk stabilization, behavior modification, body mechanics training, breathing training, flexibility, functional ROM exercises, home exercise program, joint mobilization, manual therapy, massage, Mayen taping, muscle pump exercises, neuromuscular re-education, patient education, postural training, strengthening, stretching, therapeutic activities, therapeutic exercise and therapeutic training    Frequency: 2x week  Duration in weeks: 8  Treatment plan discussed with: patient  Plan details: Prognosis above is given PT services 2x/week tapering to 1x/week over the next 2 months and home program adherence.        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  Received injections.  Has been using a lidocaine spray which has been providing relief.    PAIN LOCATION/DESCRIPTORS: front and medial knee right knee  AGGRAVATING FACTORS:  walking, stairs, dancing, prone  EASES: lidocain spray  DAY PATTERN: activity related  IMAGING:  medial comparnt OA patellofemoral compartment OA  SPECIAL QUESTIONS:    Narda denies a new onset of Nausea, Recent unexplained weight loss, History of cancer, Tingling, and Numbness.  PATIENT GOALS:     Patient Goals  Patient goals for therapy: decreased edema, decreased pain, increased  "motion and improved balance    Pain  Current pain ratin  At best pain ratin  At worst pain ratin          Objective     Tenderness     Right Knee   Tenderness in the inferior fat pad, medial joint line and medial patella.     Lumbar Screen  Lumbar range of motion within normal limits.    Neurological Testing     Sensation     Knee   Left Knee   Intact: Light touch    Right Knee   Intact: light touch     Reflexes     Right   Patellar (L4): absent (0)    Active Range of Motion     Right Knee   Flexion: 126 degrees with pain  Extension: -3 degrees with pain    Passive Range of Motion     Right Knee   Flexion: 129 degrees with pain  Extension: -1 degrees     Mobility   Patellar Mobility:     Right Knee   WFL: medial, lateral, superior and inferior    Patellar Static Positioning   Right Knee: WFL    Strength/Myotome Testing     Left Hip   Planes of Motion   Flexion: 4+  Extension: 4  Abduction: 4+    Right Hip   Planes of Motion   Flexion: 4+  Extension: 4  Abduction: 4+    Left Knee   Flexion: 4+  Prone flexion: 4+  Extension: 4+    Right Knee   Flexion: 4-  Prone flexion: 4-  Extension: 4+    Tests     Left Hip   Positive J sign.   Yang: Positive.               POC Expires Auth Status Start Date Expiration Date PT Visit Limit     no na na bomn   Date        Used 1       Remaining           Diagnosis: medial compartment Patellofemoral compartment OA + PFPS   Precautions: na   Primary Goals: walking and stairs without pain, dancing   *asterisks by exercise = given for HEP   Manuals        Knee tibial femoral mobs PA NV    DO FOTO   Knee Ext PROM NV       Towel fulcrum mobs NV       HS S supine NV               There Ex        QSET 5x5\"       SLR NV       Bridge x10       Seated HS S x10                                               Neuro Re-Ed                                                                                                Patient education Diagnosis, prognosis, activity modification     "    Re-evaluation              Ther Act                                         Modalities

## 2025-07-17 NOTE — HOME EXERCISE EDUCATION
Program_ID:[id number]   Access Code: [email or web address]  URL: [email or web address]  Date: 07-  Prepared By: Pancho Spann    Program Notes      Exercises      - Supine Bridge - 1 x daily - 7 x weekly - 3 sets - 10 reps      - Seated Hamstring Stretch with Chair - 1 x daily - 7 x weekly - 3 sets - 10 reps      - Supine Quad Set - 1 x daily - 7 x weekly - 3 sets - 10 reps

## 2025-07-18 ENCOUNTER — OFFICE VISIT (OUTPATIENT)
Dept: PHYSICAL THERAPY | Facility: CLINIC | Age: 45
End: 2025-07-18
Payer: COMMERCIAL

## 2025-07-18 DIAGNOSIS — M17.11 PRIMARY OSTEOARTHRITIS OF RIGHT KNEE: Primary | ICD-10-CM

## 2025-07-18 PROCEDURE — 97140 MANUAL THERAPY 1/> REGIONS: CPT

## 2025-07-18 PROCEDURE — 97110 THERAPEUTIC EXERCISES: CPT

## 2025-07-18 PROCEDURE — 97112 NEUROMUSCULAR REEDUCATION: CPT

## 2025-07-18 NOTE — PROGRESS NOTES
"Daily Note     Today's date: 2025  Patient name: Narda Cook  : 1980  MRN: [id number]  Referring provider: Tee Ramirez MD  Dx:   Encounter Diagnosis     ICD-10-CM    1. Primary osteoarthritis of right knee  M17.11           Start Time: 701  Stop Time: 745  Total time in clinic (min): 44 minutes    Subjective: Patient reports a little bit of pain on the bike as she engages her right knee on the pedal.       Objective: See treatment diary below      Assessment: Tolerated treatment well. Able to initiated hip and knee strengthening beyond isometric setting determined at HEP eval.  Slight pain/self report crepitis with resisted side stepping. Receptive to new exercises today and educated her to monitor how she feels over the weekend. Patient demonstrated fatigue post treatment, exhibited good technique with therapeutic exercises, and would benefit from continued PT      Plan: Continue per plan of care.       POC Expires Auth Status Start Date Expiration Date PT Visit Limit     no na na bomn   Date        Used 1       Remaining           Diagnosis: medial compartment Patellofemoral compartment OA + PFPS   Precautions: na   Primary Goals: walking and stairs without pain, dancing   *asterisks by exercise = given for HEP   Manuals        Knee tibial femoral mobs PA NV JW grade III   DO FOTO   Knee Ext PROM NV JW      Towel fulcrum mobs NV JW grade III      HS S supine NV JW              There Ex        QSET 5x5\" 5x10\"      SLR NV 2x10      Bridge x10 X8  X8 green tb abd      Seated HS S x10 Strap hs/q stretch  5x10\"      Supine Clamshell  Green x20      X Step  Pink x20      Supine tb march  Green 2x20                      Neuro Re-Ed                                                                                                Patient education Diagnosis, prognosis, activity modification        Re-evaluation              Ther Act                                         Modalities          "

## 2025-07-25 ENCOUNTER — OFFICE VISIT (OUTPATIENT)
Dept: PHYSICAL THERAPY | Facility: CLINIC | Age: 45
End: 2025-07-25
Attending: STUDENT IN AN ORGANIZED HEALTH CARE EDUCATION/TRAINING PROGRAM
Payer: COMMERCIAL

## 2025-07-25 DIAGNOSIS — M17.11 PRIMARY OSTEOARTHRITIS OF RIGHT KNEE: Primary | ICD-10-CM

## 2025-07-25 PROCEDURE — 97140 MANUAL THERAPY 1/> REGIONS: CPT

## 2025-07-25 PROCEDURE — 97112 NEUROMUSCULAR REEDUCATION: CPT

## 2025-07-25 PROCEDURE — 97110 THERAPEUTIC EXERCISES: CPT

## 2025-07-25 NOTE — PROGRESS NOTES
"Daily Note     Today's date: 2025  Patient name: Narda Cook  : 1980  MRN: [id number]  Referring provider: Tee Ramirez MD  Dx:   Encounter Diagnosis     ICD-10-CM    1. Primary osteoarthritis of right knee  M17.11           Start Time: 946  Stop Time: 1030  Total time in clinic (min): 44 minutes    Subjective: Patient reports knee pain at beginning of session as she was walking a lot on it this week.      Objective: See treatment diary below      Assessment: Tolerated treatment well. Patient demonstrated fatigue post treatment, exhibited good technique with therapeutic exercises, and would benefit from continued PT      Plan: Continue per plan of care.         POC Expires Auth Status Start Date Expiration Date PT Visit Limit     no na na bomn   Date        Used 1       Remaining           Diagnosis: medial compartment Patellofemoral compartment OA + PFPS   Precautions: na   Primary Goals: walking and stairs without pain, dancing   *asterisks by exercise = given for HEP   Manuals       Knee tibial femoral mobs PA NV JW grade III Jw Grade I II  DO FOTO   Knee Ext PROM NV JW JW     Towel fulcrum mobs NV JW grade III      HS S supine NV JW      STM   Fatimah patellar and popliteal (sensory distraction)     There Ex        QSET 5x5\" 5x10\" 5x10\"     SLR NV 2x10 5x10\"     Bridge x10 X8  X8 green tb abd X8  X8 green tb abd     Seated HS S x10 Strap hs/q stretch  5x10\" Strap hs/q stretch  5x10\"     Supine Clamshell  Green x20 Green x20     X Step  Pink x20      Supine tb march  Green 2x20                      Neuro Re-Ed                                                                                                Patient education Diagnosis, prognosis, activity modification        Re-evaluation              Ther Act                                         Modalities             Cryo   Supine @ knee 5 min                                            "

## 2025-07-29 ENCOUNTER — OFFICE VISIT (OUTPATIENT)
Dept: PHYSICAL THERAPY | Facility: CLINIC | Age: 45
End: 2025-07-29
Attending: STUDENT IN AN ORGANIZED HEALTH CARE EDUCATION/TRAINING PROGRAM
Payer: COMMERCIAL

## 2025-07-29 DIAGNOSIS — M17.11 PRIMARY OSTEOARTHRITIS OF RIGHT KNEE: Primary | ICD-10-CM

## 2025-07-29 PROCEDURE — 97112 NEUROMUSCULAR REEDUCATION: CPT

## 2025-07-29 PROCEDURE — 97110 THERAPEUTIC EXERCISES: CPT

## 2025-08-15 ENCOUNTER — EVALUATION (OUTPATIENT)
Dept: PHYSICAL THERAPY | Facility: CLINIC | Age: 45
End: 2025-08-15
Attending: STUDENT IN AN ORGANIZED HEALTH CARE EDUCATION/TRAINING PROGRAM
Payer: COMMERCIAL

## 2025-08-18 ENCOUNTER — OFFICE VISIT (OUTPATIENT)
Dept: PHYSICAL THERAPY | Facility: CLINIC | Age: 45
End: 2025-08-18
Attending: STUDENT IN AN ORGANIZED HEALTH CARE EDUCATION/TRAINING PROGRAM
Payer: COMMERCIAL

## 2025-08-18 DIAGNOSIS — M17.11 PRIMARY OSTEOARTHRITIS OF RIGHT KNEE: Primary | ICD-10-CM

## 2025-08-18 PROCEDURE — 97110 THERAPEUTIC EXERCISES: CPT

## 2025-08-18 PROCEDURE — 97112 NEUROMUSCULAR REEDUCATION: CPT

## 2025-08-22 ENCOUNTER — OFFICE VISIT (OUTPATIENT)
Dept: PHYSICAL THERAPY | Facility: CLINIC | Age: 45
End: 2025-08-22
Attending: STUDENT IN AN ORGANIZED HEALTH CARE EDUCATION/TRAINING PROGRAM
Payer: COMMERCIAL

## 2025-08-22 DIAGNOSIS — M17.11 PRIMARY OSTEOARTHRITIS OF RIGHT KNEE: Primary | ICD-10-CM

## 2025-08-22 PROCEDURE — 97110 THERAPEUTIC EXERCISES: CPT

## 2025-08-22 PROCEDURE — 97112 NEUROMUSCULAR REEDUCATION: CPT
